# Patient Record
Sex: FEMALE | Race: WHITE | NOT HISPANIC OR LATINO | Employment: FULL TIME | URBAN - METROPOLITAN AREA
[De-identification: names, ages, dates, MRNs, and addresses within clinical notes are randomized per-mention and may not be internally consistent; named-entity substitution may affect disease eponyms.]

---

## 2017-02-01 ENCOUNTER — ALLSCRIPTS OFFICE VISIT (OUTPATIENT)
Dept: OTHER | Facility: OTHER | Age: 37
End: 2017-02-01

## 2017-05-13 ENCOUNTER — GENERIC CONVERSION - ENCOUNTER (OUTPATIENT)
Dept: OTHER | Facility: OTHER | Age: 37
End: 2017-05-13

## 2017-09-21 ENCOUNTER — ALLSCRIPTS OFFICE VISIT (OUTPATIENT)
Dept: OTHER | Facility: OTHER | Age: 37
End: 2017-09-21

## 2018-01-12 NOTE — PROGRESS NOTES
Assessment    1  Encounter for preventive health examination (V70 0) (Z00 00)   2  Former smoker (V15 82) (U91 025)   3  Lyme disease (088 81) (A69 20)   4  Metastatic papillary carcinoma to lymph node (196 9) (C77 9)    Plan  Health Maintenance    · Brush your teeth 3 times a day and floss at least once a day ; Status:Complete;   Done:  31IDM8059   · Decreasing the stress in your life may help your condition improve ; Status:Complete;    Done: 52HSF4311   · Regular aerobic exercise can help reduce stress ; Status:Complete;   Done: 07BHB2204   · We encourage all of our patients to exercise regularly  30 minutes of exercise or physical  activity five or more days a week is recommended for children and adults ;  Status:Complete;   Done: 36VHK2751   · We recommend routine visits to a dentist ; Status:Complete;   Done: 48EKW9452   · We recommend that you follow the "Mediterranean diet "; Status:Complete;   Done:  21UAQ2472   · We recommend that you follow these steps to lower your risk of osteoporosis  ;  Status:Complete;   Done: 48NED7398   · Call [phone] if: You find a new or different kind of lump in your breast ;  Status:Complete;   Done: 57CFG2475   · Call [phone] if: You have any warning signs of skin cancer ; Status:Complete;    Done: 93TFF6134  Lyme disease    · Doxycycline Hyclate 100 MG Oral Capsule    Discussion/Summary  health maintenance visit healthy adult female Currently, she eats a healthy diet and has an adequate exercise regimen  cervical cancer screening is current Breast cancer screening: breast cancer screening is not indicated  Colorectal cancer screening: colorectal cancer screening is not indicated  Osteoporosis screening: bone mineral density testing is not indicated  The immunizations are up to date  Patient discussion: discussed with the patient       1) ophthalmology evaluation: Routine check   2) continue to follow with endocrine   Possible side effects of new medications were reviewed with the patient/guardian today  The treatment plan was reviewed with the patient/guardian  The patient/guardian understands and agrees with the treatment plan      Chief Complaint  PT IS HERE FOR HER YEARLY WELLNESS EXAM  DISCUSS RECENT LYME DIAGNOSIS  FLU VACCINE OCTOBER, 2016  TDAP 8 YEARS AGO WITH YOUNGER SON  GYN UP TO DATE  PT IS SEEN BY JARRELL PEREZ WIFE  History of Present Illness  HM, Adult Female: The patient is being seen for a health maintenance evaluation  General Health: The patient's health since the last visit is described as good  She has regular dental visits  She denies vision problems  She denies hearing loss  Immunizations status: up to date  Lifestyle:  She consumes a diverse and healthy diet  She does not have any weight concerns  She exercises regularly  She does not use tobacco  She consumes alcohol  She denies drug use  Reproductive health: the patient is premenopausal    Screening: cancer screening reviewed and updated  metabolic screening reviewed and updated  risk screening reviewed and updated  HPI: Patient is here today for a physical with her daughter  She is continuing to see Dr  [employer] at [facility], she had thyroid blood work in December, repeat blood work in April with an appointment in May  Her energy level is good  She is training for a half marathon  She is eating well, fruits and vegetables  Her bowel movements are regular  Review of Systems    Constitutional: No fever, no chills, feels well, no tiredness, no recent weight gain or weight loss  Eyes: No complaints of eye pain, no red eyes, no eyesight problems, no discharge, no dry eyes, no itching of eyes  ENT: no complaints of earache, no loss of hearing, no nose bleeds, no nasal discharge, no sore throat, no hoarseness  Cardiovascular: No complaints of slow heart rate, no fast heart rate, no chest pain, no palpitations, no leg claudication, no lower extremity edema  Respiratory: No complaints of shortness of breath, no wheezing, no cough, no SOB on exertion, no orthopnea, no PND  Gastrointestinal: No complaints of abdominal pain, no constipation, no nausea or vomiting, no diarrhea, no bloody stools  Genitourinary: No complaints of dysuria, no incontinence, no pelvic pain, no dysmenorrhea, no vaginal discharge or bleeding  Musculoskeletal: No complaints of arthralgias, no myalgias, no joint swelling or stiffness, no limb pain or swelling  Integumentary: No complaints of skin rash or lesions, no itching, no skin wounds, no breast pain or lump  Neurological: No complaints of headache, no confusion, no convulsions, no numbness, no dizziness or fainting, no tingling, no limb weakness, no difficulty walking  Psychiatric: Not suicidal, no sleep disturbance, no anxiety or depression, no change in personality, no emotional problems  Endocrine: No complaints of proptosis, no hot flashes, no muscle weakness, no deepening of the voice, no feelings of weakness  Hematologic/Lymphatic: No complaints of swollen glands, no swollen glands in the neck, does not bleed easily, does not bruise easily  Active Problems    1  Abnormal blood sugar (790 29) (R73 09)   2  History of Graves' disease (V12 29) (Z86 39)   3  Hypothyroidism (244 9) (E03 9)   4  Lyme disease (088 81) (A69 20)   5  Lymphadenopathy (785 6) (R59 1)   6  Metastatic papillary carcinoma to lymph node (196 9) (C77 9)   7  Migraine headache (346 90) (G43 909)   8   Need for influenza vaccination (V04 81) (Z23)    Past Medical History    · History of Depression screen (V79 0) (Z13 89)   · History of Graves' disease (V12 29) (Z86 39)   · History of pharyngitis (V12 69) (Z87 09)   · History of sinusitis (V12 69) (Z87 09)   · History of Skin rash (782 1) (R21)    Surgical History    · Denied: History Of Prior Surgery   · History of Neck Surgery    Family History  Mother    · Family history of hyperthyroidism (V18 19) (Z83 49)   · Family history of scleroderma (V19 8) (Z82 69)  Father    · Family history of hyperlipidemia (V18 19) (Z83 49)   · Family history of myocardial infarction (V17 3) (Z82 49)  Maternal Grandmother    · Family history of Graves' disease (V18 19) (Z83 49)    Social History    · Former smoker (V15 82) (B76 184)   · Occasional alcohol use   · Social alcohol use (Z78 9)    Current Meds   1  Calcitriol 0 25 MCG Oral Capsule; Take 1 tablet daily; Therapy: 62NDN0593 to Recorded   2  Doxycycline Hyclate 100 MG Oral Capsule; TAKE 1 CAPSULE EVERY 12 HOURS   DAILY; Therapy: 34PMP1475 to ()  Requested for: 63ZME0032; Last   Rx:[id number] Ordered   3  SUMAtriptan Succinate 100 MG Oral Tablet; TAKE 1 TABLET FOR MIGRAINE RELIEF    MAY REPEAT 2 HOURS LATER  MAX OF 200MG IN 24 HRS; Therapy: 92Gca3494 to (Evaluate:69Mno4694)  Requested for: 46Dsu7953; Last   Rx:75Iqh1175 Ordered   4  Synthroid 150 MCG Oral Tablet; TAKE ONE (1) TABLET(S) ONCE DAILY; Therapy: (Recorded:11Mar2016) to Recorded   5  Triamcinolone Acetonide 0 1 % External Cream; APPLY A THIN LAYER TO AFFECTED   AREA(S) TWICE DAILY; Therapy: 40MXM6835 to (Evaluate:19Ehh6347)  Requested for: 92RTJ7455; Last   Rx:34Ahi1894 Ordered    Allergies    1  No Known Drug Allergies    Vitals   Recorded: 50FUE4161 10:27AM   Temperature 97 3 F   Heart Rate 69   Systolic 90   Diastolic 62   Height 5 ft 9 in   Weight 169 lb    BMI Calculated 24 96   BSA Calculated 1 92   O2 Saturation 99     Physical Exam    Constitutional   General appearance: No acute distress, well appearing and well nourished  Head and Face   Head and face: Normal     Palpation of the face and sinuses: No sinus tenderness  Eyes   Conjunctiva and lids: No swelling, erythema or discharge  Pupils and irises: Equal, round, reactive to light      Ears, Nose, Mouth, and Throat   External inspection of ears and nose: Normal     Otoscopic examination: Tympanic membranes translucent with normal light reflex  Canals patent without erythema  Hearing: Normal     Nasal mucosa, septum, and turbinates: Normal without edema or erythema  Lips, teeth, and gums: Normal, good dentition  Oropharynx: Normal with no erythema, edema, exudate or lesions  Neck   Neck: Supple, symmetric, trachea midline, no masses  Thyroid: Normal, no thyromegaly  Pulmonary   Respiratory effort: No increased work of breathing or signs of respiratory distress  Auscultation of lungs: Clear to auscultation  Cardiovascular   Auscultation of heart: Normal rate and rhythm, normal S1 and S2, no murmurs  Examination of extremities for edema and/or varicosities: Normal     Chest   Breasts: Normal, no dimpling or skin changes appreciated  Chest: Normal     Abdomen   Abdomen: Non-tender, no masses  Liver and spleen: No hepatomegaly or splenomegaly  Lymphatic   Palpation of lymph nodes in neck: No lymphadenopathy  Musculoskeletal   Gait and station: Normal     Digits and nails: Normal without clubbing or cyanosis  Joints, bones, and muscles: Normal     Range of motion: Normal     Stability: Normal     Muscle strength/tone: Normal     Skin   Skin and subcutaneous tissue: Normal without rashes or lesions  Palpation of skin and subcutaneous tissue: Normal turgor  Neurologic   Cortical function: Normal mental status  Coordination: Normal finger to nose and heel to shin  Psychiatric   Judgment and insight: Normal     Orientation to person, place, and time: Normal     Recent and remote memory: Intact  Mood and affect: Normal        Health Management  History of Depression screen   *VB-Depression Screening; every 1 year; Last 24AUD5878; Next Due: 05DNP8093;  Active    Signatures   Electronically signed by : Deb Murry DO; Feb 1 2017 10:19PM EST                       (Author)

## 2018-01-12 NOTE — RESULT NOTES
Verified Results  (1) CBC/PLT/DIFF [id number] 02:23PM Shyrl Natalio     Test Name Result Flag Reference   WBC 3 8 x10E3/uL  3 4-10 8   RBC 4 36 x10E6/uL  3 77-5 28   Hemoglobin 13 1 g/dL  11 1-15 9   Hematocrit 39 5 %  34 0-46  6   MCV 91 fL  79-97   MCH 30 0 pg  26 6-33 0   MCHC 33 2 g/dL  31 5-35 7   RDW 14 1 %  12 3-15 4   Platelets 932 K47B6/EH  150-379   Neutrophils 54 %     Lymphs 34 %     Monocytes 8 %     Eos 3 %     Basos 1 %     Neutrophils (Absolute) 2 1 x10E3/uL  1 4-7 0   Lymphs (Absolute) 1 3 x10E3/uL  0 7-3 1   Monocytes(Absolute) 0 3 x10E3/uL  0 1-0 9   Eos (Absolute) 0 1 x10E3/uL  0 0-0 4   Baso (Absolute) 0 0 x10E3/uL  0 0-0 2   Immature Granulocytes 0 %     Immature Grans (Abs) 0 0 x10E3/uL  0 0-0 1     (1) TSH [id number] 02:23PM yrl Natalio     Test Name Result Flag Reference   TSH 0 025 uIU/mL L 0 450-4 500     (1) COMPREHENSIVE METABOLIC PANEL 58HEJ3582 85:47YU Shyrl Natalio     Test Name Result Flag Reference   Glucose, Serum 97 mg/dL  65-99   BUN 13 mg/dL  6-20   Creatinine, Serum 1 02 mg/dL H 0 57-1 00   eGFR If NonAfricn Am 70 mL/min/1 73  >59   eGFR If Africn Am 81 mL/min/1 73  >59   BUN/Creatinine Ratio 13  8-20   Sodium, Serum 138 mmol/L  136-144   Potassium, Serum 4 3 mmol/L  3 5-5 2   Chloride, Serum 101 mmol/L     Carbon Dioxide, Total 24 mmol/L  18-29   Calcium, Serum 8 6 mg/dL L 8 7-10 2   Protein, Total, Serum 7 3 g/dL  6 0-8 5   Albumin, Serum 4 4 g/dL  3 5-5 5   Globulin, Total 2 9 g/dL  1 5-4 5   A/G Ratio 1 5  1 1-2 5   Bilirubin, Total 0 4 mg/dL  0 0-1 2   Alkaline Phosphatase, S 58 IU/L     AST (SGOT) 28 IU/L  0-40   ALT (SGPT) 21 IU/L  0-32     (1) LIPID PANEL, FASTING [id number] 02:23PM Jade Lance     Test Name Result Flag Reference   Cholesterol, Total 217 mg/dL H 100-199   Triglycerides 75 mg/dL  0-149   HDL Cholesterol 70 mg/dL  >39   According to ATP-III Guidelines, HDL-C >59 mg/dL is considered a  negative risk factor for CHD     VLDL Cholesterol David 15 mg/dL 5-40   LDL Cholesterol Calc 132 mg/dL H 0-99   T  Chol/HDL Ratio 3 1 ratio units  0 0-4 4   T  Chol/HDL Ratio                                                             Men  Women                                               1/2 Avg  Risk  3 4    3 3                                                   Avg Risk  5 0    4 4                                                2X Avg  Risk  9 6    7 1                                                3X Avg  Risk 23 4   11 0

## 2018-01-14 VITALS
BODY MASS INDEX: 24.88 KG/M2 | WEIGHT: 168 LBS | HEIGHT: 69 IN | TEMPERATURE: 97.3 F | OXYGEN SATURATION: 98 % | HEART RATE: 77 BPM | SYSTOLIC BLOOD PRESSURE: 102 MMHG | DIASTOLIC BLOOD PRESSURE: 64 MMHG

## 2018-01-14 VITALS
HEART RATE: 69 BPM | DIASTOLIC BLOOD PRESSURE: 62 MMHG | WEIGHT: 169 LBS | BODY MASS INDEX: 25.03 KG/M2 | TEMPERATURE: 97.3 F | OXYGEN SATURATION: 99 % | HEIGHT: 69 IN | SYSTOLIC BLOOD PRESSURE: 90 MMHG

## 2018-01-15 NOTE — RESULT NOTES
Verified Results  ([facility]) Lyme Ab/Western Blot Reflex [id number] 02:23PM Alycia Part     Test Name Result Flag Reference   Lyme IgG/IgM Ab 1 75 ISR H 0 00-0 90   Negative         <0 91                                                 Equivocal  0 91 - 1 09                                                 Positive         >1 09   Lyme Disease Ab, Quant, IgM 3 97 index H 0 00-0 79   Negative         <0 80                                                 Equivocal  0 80 - 1 19                                                 Positive         >1 19                  IgM levels may peak at 3-6 weeks post infection, then                  gradually decline  IgG P93 Ab  Absent     IgG P66 Ab  Present A    IgG P58 Ab  Absent     IgG P45 Ab  Present A    Lyme IgG WB Interp  Positive A    Positive: 5 of the following                                                Borrelia-specific bands:                                                18,23,28,30,39,41,45,58,                                                66, and 93  Negative: No bands or banding                                                patterns which do not                                                meet positive criteria  IgM P41 Ab  Present A    IgM P39 Ab  Present A    IgM P23 Ab  Present A    Lyme IgM WB Interp  Positive A    Note: An equivocal or positive EIA result followed by a negative  Western Blot result is considered NEGATIVE  An equivocal or positive  EIA result followed by a positive Western Blot is considered POSITIVE  by the CDC  Positive: 2 of the following bands: 23,39 or 41  Negative: No bands or banding patterns which do not meet positive  criteria    Criteria for positivity are those recommended by CDC/ASTPHLD   p23=Osp C, w76=zcsojsqse  Note:  Sera from individuals with the following may cross react in the  Lyme Western Blot assays: other spirochetal diseases (periodontal  disease, leptospirosis, relapsing fever, yaws, and pinta);  connective autoimmune (Rheumatoid Arthritis and Systemic Lupus  Erythematosus and also individuals with Antinuclear Antibody);  other infections COFFEE [facility] Spotted Fever; Juan Luis-Barr Virus,  and Cytomegalovirus)  IgG P41 Ab  Present A    IgG P39 Ab  Present A    IgG P30 Ab  Present A    IgG P28 Ab  Absent     IgG P23 Ab  Present A    IgG P18 Ab   Absent

## 2018-06-25 ENCOUNTER — TELEPHONE (OUTPATIENT)
Dept: FAMILY MEDICINE CLINIC | Facility: CLINIC | Age: 38
End: 2018-06-25

## 2018-06-25 NOTE — TELEPHONE ENCOUNTER
Patient called stating that she had a very severe migraine on Saturday with new symptoms, but did not go to the hospital  She states that she had auras, tingling in the extremities, and forgot her children and 's name  States that her  thought she may have been having a stroke  She did not seek medical attention because she was away and an hour away from the hospital  She also did not have of her migraine meds on her  Please advise what she should do next  Thanks

## 2018-06-25 NOTE — TELEPHONE ENCOUNTER
Ov, may need other studies, if recurrent, should go to ED  Have migraine meds on hand and we could always send some to pharmacy if she is away

## 2018-06-27 ENCOUNTER — OFFICE VISIT (OUTPATIENT)
Dept: FAMILY MEDICINE CLINIC | Facility: CLINIC | Age: 38
End: 2018-06-27
Payer: COMMERCIAL

## 2018-06-27 VITALS
HEIGHT: 69 IN | SYSTOLIC BLOOD PRESSURE: 102 MMHG | HEART RATE: 85 BPM | OXYGEN SATURATION: 99 % | BODY MASS INDEX: 25.36 KG/M2 | DIASTOLIC BLOOD PRESSURE: 80 MMHG | TEMPERATURE: 98.2 F | WEIGHT: 171.25 LBS

## 2018-06-27 DIAGNOSIS — Z13.0 SCREENING FOR ENDOCRINE, METABOLIC AND IMMUNITY DISORDER: ICD-10-CM

## 2018-06-27 DIAGNOSIS — R73.09 ABNORMAL BLOOD SUGAR: ICD-10-CM

## 2018-06-27 DIAGNOSIS — G44.209 ACUTE NON INTRACTABLE TENSION-TYPE HEADACHE: Primary | ICD-10-CM

## 2018-06-27 DIAGNOSIS — Z13.220 SCREENING FOR LIPID DISORDERS: ICD-10-CM

## 2018-06-27 DIAGNOSIS — G43.109 MIGRAINE WITH AURA AND WITHOUT STATUS MIGRAINOSUS, NOT INTRACTABLE: ICD-10-CM

## 2018-06-27 DIAGNOSIS — Z13.0 SCREENING FOR DEFICIENCY ANEMIA: ICD-10-CM

## 2018-06-27 DIAGNOSIS — Z13.1 SCREENING FOR DIABETES MELLITUS (DM): ICD-10-CM

## 2018-06-27 DIAGNOSIS — Z13.29 SCREENING FOR ENDOCRINE, METABOLIC AND IMMUNITY DISORDER: ICD-10-CM

## 2018-06-27 DIAGNOSIS — Z13.228 SCREENING FOR ENDOCRINE, METABOLIC AND IMMUNITY DISORDER: ICD-10-CM

## 2018-06-27 PROBLEM — M72.2 PLANTAR FASCIITIS, LEFT: Status: ACTIVE | Noted: 2017-09-21

## 2018-06-27 PROCEDURE — 3008F BODY MASS INDEX DOCD: CPT | Performed by: FAMILY MEDICINE

## 2018-06-27 PROCEDURE — 1036F TOBACCO NON-USER: CPT | Performed by: FAMILY MEDICINE

## 2018-06-27 PROCEDURE — 99214 OFFICE O/P EST MOD 30 MIN: CPT | Performed by: FAMILY MEDICINE

## 2018-06-27 RX ORDER — LEVOTHYROXINE SODIUM 137 MCG
TABLET ORAL
Refills: 5 | COMMUNITY
Start: 2018-06-14

## 2018-06-27 RX ORDER — CALCITRIOL 0.25 UG/1
1 CAPSULE, LIQUID FILLED ORAL DAILY
COMMUNITY
Start: 2014-12-03 | End: 2020-02-05 | Stop reason: ALTCHOICE

## 2018-06-27 RX ORDER — LEVOTHYROXINE SODIUM 137 UG/1
TABLET ORAL
COMMUNITY
End: 2020-02-05 | Stop reason: ALTCHOICE

## 2018-06-27 RX ORDER — SUMATRIPTAN 100 MG/1
TABLET, FILM COATED ORAL
COMMUNITY
Start: 2015-04-14

## 2018-06-27 RX ORDER — TRIAMCINOLONE ACETONIDE 1 MG/G
CREAM TOPICAL 2 TIMES DAILY
COMMUNITY
Start: 2016-12-09 | End: 2020-02-05 | Stop reason: ALTCHOICE

## 2018-06-27 RX ORDER — CALCITRIOL 0.5 UG/1
0.5 CAPSULE, LIQUID FILLED ORAL DAILY
Refills: 5 | COMMUNITY
Start: 2018-06-14

## 2018-06-27 NOTE — PATIENT INSTRUCTIONS
Ct head without contrast  Use imitrex at first onset of headache, may repeat in 2 hours , 2 tabs in 24 hours     Lab lisa fasting blood work

## 2018-06-27 NOTE — PROGRESS NOTES
Assessment/Plan:      Diagnoses and all orders for this visit:    Acute non intractable tension-type headache  -     CT head wo contrast; Future    Other orders  -     calcitriol (ROCALTROL) 0 5 MCG capsule; Take 0 5 mcg by mouth daily  -     calcitriol (ROCALTROL) 0 25 mcg capsule; Take 1 tablet by mouth daily  -     SYNTHROID 137 MCG tablet; TAKE 1 TABLET MON THRU SAT AND 1/2 TABLET ON SUNDAY  -     SUMAtriptan (IMITREX) 100 mg tablet; Take by mouth  -     levothyroxine (SYNTHROID) 137 mcg tablet; Take by mouth  -     triamcinolone (KENALOG) 0 1 % cream; Apply topically 2 (two) times a day        Migraine vs tia: pt seems to have no residual symptoms  Headache now resolved  Pt concerned it will be back  Hypothyroid : had thyroid recently checked, normal  Subjective:     Patient ID: Myron Shin is a 43 y o  female  Saturday 6pm at restaurant in the [place] [address], pt developed aura then migraine, took ibuprofen 600mg [facility] , had memory changes- could not remember her name or her childrens names, having trouble communicating, nausea, generalized weakness, no vomiting, tingling in hands and feet and face  8:30 neurolgical symptoms improved  Still with Neck and head pain  Headache and fatigue persisted into the next day  Headache not as severe  She did not have her migraine medication with her  Mom had migraines  Had hormonal changes  She has not had a migraine in a few years  Review of Systems   Constitutional: Positive for fatigue  Negative for fever  HENT: Negative  Eyes: Negative  Respiratory: Negative  Negative for cough  Cardiovascular: Negative  Gastrointestinal: Negative  Endocrine: Negative  Genitourinary: Negative  Musculoskeletal: Negative  Skin: Negative  Allergic/Immunologic: Negative  Neurological: Positive for speech difficulty, weakness, numbness and headaches  Psychiatric/Behavioral: Negative            The following portions of the patient's history were reviewed and updated as appropriate: allergies, current medications, past family history, past medical history, past social history, past surgical history and problem list     Objective:  Vitals:    06/27/18 1441   BP: 102/80   Pulse: 85   Temp: 98 2 °F (36 8 °C)   SpO2: 99%   Weight: 77 7 kg (171 lb 4 oz)   Height: 5' 9" (1 753 m)      Physical Exam   Constitutional: She is oriented to person, place, and time  She appears well-developed and well-nourished  HENT:   Head: Normocephalic and atraumatic  Cardiovascular: Normal rate, regular rhythm and normal heart sounds  Pulmonary/Chest: Effort normal and breath sounds normal    Abdominal: Soft  Bowel sounds are normal    Neurological: She is alert and oriented to person, place, and time  Skin: Skin is warm and dry  Psychiatric: She has a normal mood and affect  Her behavior is normal  Judgment and thought content normal    Nursing note and vitals reviewed

## 2018-06-28 ENCOUNTER — TELEPHONE (OUTPATIENT)
Dept: FAMILY MEDICINE CLINIC | Facility: CLINIC | Age: 38
End: 2018-06-28

## 2018-06-28 PROBLEM — Z13.228 SCREENING FOR ENDOCRINE, METABOLIC AND IMMUNITY DISORDER: Status: ACTIVE | Noted: 2018-06-28

## 2018-06-28 PROBLEM — Z13.220 SCREENING FOR LIPID DISORDERS: Status: ACTIVE | Noted: 2018-06-28

## 2018-06-28 PROBLEM — Z13.29 SCREENING FOR ENDOCRINE, METABOLIC AND IMMUNITY DISORDER: Status: ACTIVE | Noted: 2018-06-28

## 2018-06-28 PROBLEM — Z13.0 SCREENING FOR ENDOCRINE, METABOLIC AND IMMUNITY DISORDER: Status: ACTIVE | Noted: 2018-06-28

## 2018-06-28 PROBLEM — Z13.1 SCREENING FOR DIABETES MELLITUS (DM): Status: ACTIVE | Noted: 2018-06-28

## 2018-06-28 LAB
ALBUMIN SERPL-MCNC: 4.2 G/DL (ref 3.5–5.5)
ALBUMIN/GLOB SERPL: 1.7 {RATIO} (ref 1.2–2.2)
ALP SERPL-CCNC: 57 IU/L (ref 39–117)
ALT SERPL-CCNC: 15 IU/L (ref 0–32)
AST SERPL-CCNC: 21 IU/L (ref 0–40)
BASOPHILS # BLD AUTO: 0.1 X10E3/UL (ref 0–0.2)
BASOPHILS NFR BLD AUTO: 1 %
BILIRUB SERPL-MCNC: 0.3 MG/DL (ref 0–1.2)
BUN SERPL-MCNC: 12 MG/DL (ref 6–20)
BUN/CREAT SERPL: 11 (ref 9–23)
CALCIUM SERPL-MCNC: 8 MG/DL (ref 8.7–10.2)
CHLORIDE SERPL-SCNC: 101 MMOL/L (ref 96–106)
CHOLEST SERPL-MCNC: 173 MG/DL (ref 100–199)
CO2 SERPL-SCNC: 24 MMOL/L (ref 20–29)
CREAT SERPL-MCNC: 1.12 MG/DL (ref 0.57–1)
EOSINOPHIL # BLD AUTO: 0.1 X10E3/UL (ref 0–0.4)
EOSINOPHIL NFR BLD AUTO: 3 %
ERYTHROCYTE [DISTWIDTH] IN BLOOD BY AUTOMATED COUNT: 14.2 % (ref 12.3–15.4)
GLOBULIN SER-MCNC: 2.5 G/DL (ref 1.5–4.5)
GLUCOSE SERPL-MCNC: 94 MG/DL (ref 65–99)
HCT VFR BLD AUTO: 35.5 % (ref 34–46.6)
HDLC SERPL-MCNC: 72 MG/DL
HGB BLD-MCNC: 12 G/DL (ref 11.1–15.9)
IMM GRANULOCYTES # BLD: 0 X10E3/UL (ref 0–0.1)
IMM GRANULOCYTES NFR BLD: 0 %
LDLC SERPL CALC-MCNC: 92 MG/DL (ref 0–99)
LDLC/HDLC SERPL: 1.3 RATIO (ref 0–3.2)
LYMPHOCYTES # BLD AUTO: 1.4 X10E3/UL (ref 0.7–3.1)
LYMPHOCYTES NFR BLD AUTO: 34 %
MCH RBC QN AUTO: 30.1 PG (ref 26.6–33)
MCHC RBC AUTO-ENTMCNC: 33.8 G/DL (ref 31.5–35.7)
MCV RBC AUTO: 89 FL (ref 79–97)
MONOCYTES # BLD AUTO: 0.3 X10E3/UL (ref 0.1–0.9)
MONOCYTES NFR BLD AUTO: 7 %
NEUTROPHILS # BLD AUTO: 2.3 X10E3/UL (ref 1.4–7)
NEUTROPHILS NFR BLD AUTO: 55 %
PLATELET # BLD AUTO: 190 X10E3/UL (ref 150–379)
POTASSIUM SERPL-SCNC: 4 MMOL/L (ref 3.5–5.2)
PROT SERPL-MCNC: 6.7 G/DL (ref 6–8.5)
RBC # BLD AUTO: 3.99 X10E6/UL (ref 3.77–5.28)
SL AMB EGFR AFRICAN AMERICAN: 72 ML/MIN/1.73
SL AMB EGFR NON AFRICAN AMERICAN: 62 ML/MIN/1.73
SL AMB VLDL CHOLESTEROL CALC: 9 MG/DL (ref 5–40)
SODIUM SERPL-SCNC: 140 MMOL/L (ref 134–144)
TRIGL SERPL-MCNC: 43 MG/DL (ref 0–149)
WBC # BLD AUTO: 4.1 X10E3/UL (ref 3.4–10.8)

## 2018-07-03 ENCOUNTER — HOSPITAL ENCOUNTER (OUTPATIENT)
Dept: CT IMAGING | Facility: HOSPITAL | Age: 38
Discharge: HOME | End: 2018-07-03
Payer: COMMERCIAL

## 2018-07-03 DIAGNOSIS — G44.209 ACUTE NON INTRACTABLE TENSION-TYPE HEADACHE: ICD-10-CM

## 2018-07-03 PROCEDURE — 70450 CT HEAD/BRAIN W/O DYE: CPT

## 2019-12-05 ENCOUNTER — TELEPHONE (OUTPATIENT)
Dept: FAMILY MEDICINE CLINIC | Facility: CLINIC | Age: 39
End: 2019-12-05

## 2020-02-05 ENCOUNTER — OFFICE VISIT (OUTPATIENT)
Dept: FAMILY MEDICINE CLINIC | Facility: CLINIC | Age: 40
End: 2020-02-05
Payer: COMMERCIAL

## 2020-02-05 VITALS
HEIGHT: 69 IN | OXYGEN SATURATION: 99 % | TEMPERATURE: 97.5 F | WEIGHT: 173.75 LBS | SYSTOLIC BLOOD PRESSURE: 120 MMHG | DIASTOLIC BLOOD PRESSURE: 80 MMHG | BODY MASS INDEX: 25.73 KG/M2 | HEART RATE: 67 BPM

## 2020-02-05 DIAGNOSIS — Z12.39 BREAST CANCER SCREENING: ICD-10-CM

## 2020-02-05 DIAGNOSIS — C77.9 METASTATIC PAPILLARY CARCINOMA TO LYMPH NODE (HCC): ICD-10-CM

## 2020-02-05 DIAGNOSIS — G47.00 PERSISTENT INSOMNIA: ICD-10-CM

## 2020-02-05 DIAGNOSIS — Z13.0 SCREENING FOR DEFICIENCY ANEMIA: ICD-10-CM

## 2020-02-05 DIAGNOSIS — R10.9 ABDOMINAL CRAMPING: ICD-10-CM

## 2020-02-05 DIAGNOSIS — Z00.00 WELL ADULT EXAM: Primary | ICD-10-CM

## 2020-02-05 DIAGNOSIS — Z23 NEED FOR TDAP VACCINATION: ICD-10-CM

## 2020-02-05 DIAGNOSIS — Z13.220 SCREENING FOR LIPID DISORDERS: ICD-10-CM

## 2020-02-05 DIAGNOSIS — Z13.1 SCREENING FOR DIABETES MELLITUS (DM): ICD-10-CM

## 2020-02-05 PROCEDURE — 99396 PREV VISIT EST AGE 40-64: CPT | Performed by: FAMILY MEDICINE

## 2020-02-05 PROCEDURE — 90471 IMMUNIZATION ADMIN: CPT

## 2020-02-05 PROCEDURE — 3008F BODY MASS INDEX DOCD: CPT | Performed by: FAMILY MEDICINE

## 2020-02-05 PROCEDURE — 90715 TDAP VACCINE 7 YRS/> IM: CPT

## 2020-02-05 RX ORDER — TRAZODONE HYDROCHLORIDE 50 MG/1
50 TABLET ORAL
Qty: 30 TABLET | Refills: 2 | Status: SHIPPED | OUTPATIENT
Start: 2020-02-05 | End: 2020-04-28 | Stop reason: SDUPTHER

## 2020-02-05 NOTE — PROGRESS NOTES
Laxmi Gregorio is a 35 y o   female and is here for routine health maintenance  The patient reports no problems  History of Present Illness     Pt here for physical today  Increase stress level at work  Has intermittent abdominal cramping with  increased stress levels  She also has been having some trouble sleeping  She has trouble falling asleep and staying asleep  Patient works as a special   She is here with her 6year-old daughter today  She has been feeling good and denies any recent illnesses  She denies any chest pains or shortness of breath  She eats well  She has been exercising regularly  Her bowel movements are regular  Well Adult Physical   Patient here for a comprehensive physical exam       Diet and Physical Activity  Diet: well balanced diet  Weight concerns: Patient is overweight (BMI 25 0-29  9)  Exercise: frequently      Depression Screen  PHQ-9 Depression Screening    PHQ-9:    Frequency of the following problems over the past two weeks:       Little interest or pleasure in doing things:  0 - not at all  Feeling down, depressed, or hopeless:  0 - not at all  PHQ-2 Score:  0          General Health  Hearing: Normal:  bilateral  Vision: no vision problems  Dental: regular dental visits     History:  LMP: No LMP recorded  Cancer Screening  Colononoscopy not indicated  Mammogram pt will schedule   Pap pt will schedule   Abnormal pap? no  Smoker NO Annual screening with low-dose helical computed tomography (CT) for patients age 54 to 76 years with history of smoking at least 30 pack-years and, if a former smoker, had quit within the previous 15 years      The following portions of the patient's history were reviewed and updated as appropriate: allergies, current medications, past family history, past medical history, past social history, past surgical history and problem list     Review of Systems     Review of Systems   Constitutional: Negative  Negative for fatigue and fever  HENT: Negative  Eyes: Negative  Respiratory: Negative  Negative for cough  Cardiovascular: Negative  Gastrointestinal: Positive for abdominal pain  Negative for blood in stool, constipation, nausea and vomiting  Endocrine: Negative  Genitourinary: Negative  Musculoskeletal: Negative  Skin: Negative  Allergic/Immunologic: Negative  Neurological: Negative  Psychiatric/Behavioral: Positive for sleep disturbance  Past Medical History     Past Medical History:   Diagnosis Date    Skin rash     Last Assessed [id number]         Past Surgical History     Past Surgical History:   Procedure Laterality Date    NECK SURGERY      Last Assessed [id number]       Social History     Social History     Socioeconomic History    Marital status: /Civil Union     Spouse name: None    Number of children: None    Years of education: None    Highest education level: None   Occupational History    None   Social Needs    Financial resource strain: None    Food insecurity:     Worry: None     Inability: None    Transportation needs:     Medical: None     Non-medical: None   Tobacco Use    Smoking status: Former Smoker    Smokeless tobacco: Never Used   Substance and Sexual Activity    Alcohol use: Yes     Comment: Occasional social use    Drug use: None    Sexual activity: None   Lifestyle    Physical activity:     Days per week: None     Minutes per session: None    Stress: None   Relationships    Social connections:     Talks on phone: None     Gets together: None     Attends Zoroastrianism service: None     Active member of club or organization: None     Attends meetings of clubs or organizations: None     Relationship status: None    Intimate partner violence:     Fear of current or ex partner: None     Emotionally abused: None     Physically abused: None     Forced sexual activity: None   Other Topics Concern    None   Social History Narrative    None       Family History     Family History   Problem Relation Age of Onset    Thyroid disease Mother         Hyperthyroidism    Scleroderma Mother     Hyperlipidemia Father     Heart attack Father    Josem Linea' disease Maternal Grandmother        Current Medications       Current Outpatient Medications:     calcitriol (ROCALTROL) 0 5 MCG capsule, Take 0 5 mcg by mouth daily, Disp: , Rfl: 5    Calcium Carbonate-Vitamin D 500-50 MG-UNIT CAPS, Take by mouth, Disp: , Rfl:     SUMAtriptan (IMITREX) 100 mg tablet, Take by mouth, Disp: , Rfl:     SYNTHROID 137 MCG tablet, TAKE 1 TABLET MON THRU SAT AND 1/2 TABLET ON SUNDAY, Disp: , Rfl: 5    hyoscyamine (LEVSIN/SL) 0 125 mg SL tablet, Take 1 tablet (0 125 mg total) by mouth every 4 (four) hours as needed for cramping, Disp: 30 tablet, Rfl: 2    traZODone (DESYREL) 50 mg tablet, Take 1 tablet (50 mg total) by mouth daily at bedtime, Disp: 30 tablet, Rfl: 2     Allergies     No Known Allergies    Objective     /80   Pulse 67   Temp 97 5 °F (36 4 °C)   Ht 5' 9" (1 753 m)   Wt 78 8 kg (173 lb 12 oz)   SpO2 99%   BMI 25 66 kg/m²      Physical Exam   Constitutional: She is oriented to person, place, and time  She appears well-developed and well-nourished  HENT:   Head: Normocephalic  Right Ear: External ear normal    Left Ear: External ear normal    Nose: Nose normal    Mouth/Throat: Oropharynx is clear and moist    Eyes: Pupils are equal, round, and reactive to light  Conjunctivae are normal    Neck: Normal range of motion  Neck supple  Cardiovascular: Normal rate, regular rhythm, normal heart sounds and intact distal pulses  Pulmonary/Chest: Effort normal and breath sounds normal    Abdominal: Soft  Bowel sounds are normal  She exhibits no mass  There is no tenderness  There is no guarding  Neurological: She is alert and oriented to person, place, and time  Skin: Skin is warm and dry  Psychiatric: She has a normal mood and affect   Her behavior is normal  Judgment and thought content normal    Nursing note and vitals reviewed  BMI Counseling: Body mass index is 25 66 kg/m²  The BMI is above normal  Nutrition recommendations include reducing portion sizes  Exercise recommendations include exercising 3-5 times per week  No exam data present    Health Maintenance     Health Maintenance   Topic Date Due    MAMMOGRAM  1979    HIV Screening  02/28/1994    BMI: Followup Plan  02/28/1997    Annual Physical  02/28/1997    Cervical Cancer Screening  02/28/2000    Pneumococcal Vaccine: Pediatrics (0 to 5 Years) and At-Risk Patients (6 to 59 Years) (2 of 3 - PPSV23) 05/21/2015    Depression Screening PHQ  02/05/2021    BMI: Adult  02/05/2021    DTaP,Tdap,and Td Vaccines (2 - Td) 02/05/2030    Pneumococcal Vaccine: 65+ Years (2 of 2 - PPSV23) 02/28/2044    Influenza Vaccine  Completed    HIB Vaccine  Aged Out    Hepatitis B Vaccine  Aged Out    IPV Vaccine  Aged Out    Hepatitis A Vaccine  Aged Out    Meningococcal ACWY Vaccine  Aged Out    HPV Vaccine  Aged Out     Immunization History   Administered Date(s) Administered    H1N1, All Formulations 11/17/2009    INFLUENZA 10/01/2016, 09/29/2018    Influenza Quadrivalent, 6-35 Months IM 10/28/2014, 09/21/2017    Influenza TIV (IM) 10/01/2016    Influenza, injectable, quadrivalent, preservative free 0 5 mL 10/01/2019    Pneumococcal Conjugate 13-Valent 03/26/2015    Tdap 02/05/2020       Assessment/Plan         1  Healthy female exam   2  Patient Counseling:   · Nutrition: Stressed importance of a well balanced diet, moderation of sodium/saturated fat, caloric balance and sufficient intake of fiber  · Exercise: Stressed the importance of regular exercise with a goal of 150 minutes per week  · Dental Health: Discussed daily flossing and brushing and regular dental visits     · Immunizations reviewed  · Discussed benefits of screening   · Discussed the patient's BMI with her  The BMI is above average; BMI management plan is completed  3  Cancer Screening   4  Labs   5  Follow up in one year      Alva Salazar DO

## 2020-03-05 LAB
ALBUMIN SERPL-MCNC: 4.3 G/DL (ref 3.8–4.8)
ALBUMIN/GLOB SERPL: 1.8 {RATIO} (ref 1.2–2.2)
ALP SERPL-CCNC: 66 IU/L (ref 39–117)
ALT SERPL-CCNC: 24 IU/L (ref 0–32)
AST SERPL-CCNC: 39 IU/L (ref 0–40)
BASOPHILS # BLD AUTO: 0 X10E3/UL (ref 0–0.2)
BASOPHILS NFR BLD AUTO: 0 %
BILIRUB SERPL-MCNC: 0.2 MG/DL (ref 0–1.2)
BUN SERPL-MCNC: 13 MG/DL (ref 6–24)
BUN/CREAT SERPL: 11 (ref 9–23)
CALCIUM SERPL-MCNC: 8.1 MG/DL (ref 8.7–10.2)
CHLORIDE SERPL-SCNC: 102 MMOL/L (ref 96–106)
CHOLEST SERPL-MCNC: 191 MG/DL (ref 100–199)
CO2 SERPL-SCNC: 22 MMOL/L (ref 20–29)
CREAT SERPL-MCNC: 1.21 MG/DL (ref 0.57–1)
EOSINOPHIL # BLD AUTO: 0.1 X10E3/UL (ref 0–0.4)
EOSINOPHIL NFR BLD AUTO: 2 %
ERYTHROCYTE [DISTWIDTH] IN BLOOD BY AUTOMATED COUNT: 15.8 % (ref 11.7–15.4)
GLOBULIN SER-MCNC: 2.4 G/DL (ref 1.5–4.5)
GLUCOSE SERPL-MCNC: 93 MG/DL (ref 65–99)
HCT VFR BLD AUTO: 30.8 % (ref 34–46.6)
HDLC SERPL-MCNC: 79 MG/DL
HGB BLD-MCNC: 9.6 G/DL (ref 11.1–15.9)
IMM GRANULOCYTES # BLD: 0 X10E3/UL (ref 0–0.1)
IMM GRANULOCYTES NFR BLD: 0 %
LDLC SERPL CALC-MCNC: 103 MG/DL (ref 0–99)
LDLC/HDLC SERPL: 1.3 RATIO (ref 0–3.2)
LYMPHOCYTES # BLD AUTO: 1.2 X10E3/UL (ref 0.7–3.1)
LYMPHOCYTES NFR BLD AUTO: 21 %
MCH RBC QN AUTO: 25.6 PG (ref 26.6–33)
MCHC RBC AUTO-ENTMCNC: 31.2 G/DL (ref 31.5–35.7)
MCV RBC AUTO: 82 FL (ref 79–97)
MONOCYTES # BLD AUTO: 0.3 X10E3/UL (ref 0.1–0.9)
MONOCYTES NFR BLD AUTO: 6 %
NEUTROPHILS # BLD AUTO: 3.9 X10E3/UL (ref 1.4–7)
NEUTROPHILS NFR BLD AUTO: 71 %
PLATELET # BLD AUTO: 251 X10E3/UL (ref 150–450)
POTASSIUM SERPL-SCNC: 4.3 MMOL/L (ref 3.5–5.2)
PROT SERPL-MCNC: 6.7 G/DL (ref 6–8.5)
RBC # BLD AUTO: 3.75 X10E6/UL (ref 3.77–5.28)
SL AMB EGFR AFRICAN AMERICAN: 64 ML/MIN/1.73
SL AMB EGFR NON AFRICAN AMERICAN: 56 ML/MIN/1.73
SL AMB VLDL CHOLESTEROL CALC: 9 MG/DL (ref 5–40)
SODIUM SERPL-SCNC: 138 MMOL/L (ref 134–144)
TRIGL SERPL-MCNC: 44 MG/DL (ref 0–149)
WBC # BLD AUTO: 5.5 X10E3/UL (ref 3.4–10.8)

## 2020-03-11 LAB — IRON SERPL-MCNC: 21 UG/DL (ref 27–159)

## 2020-03-13 DIAGNOSIS — D50.8 IRON DEFICIENCY ANEMIA SECONDARY TO INADEQUATE DIETARY IRON INTAKE: Primary | ICD-10-CM

## 2020-03-17 DIAGNOSIS — C73 THYROID CANCER (HCC): Primary | ICD-10-CM

## 2020-04-28 DIAGNOSIS — G47.00 PERSISTENT INSOMNIA: ICD-10-CM

## 2020-04-29 RX ORDER — TRAZODONE HYDROCHLORIDE 50 MG/1
50 TABLET ORAL
Qty: 30 TABLET | Refills: 2 | Status: SHIPPED | OUTPATIENT
Start: 2020-04-29

## 2021-03-08 ENCOUNTER — HOSPITAL ENCOUNTER (OUTPATIENT)
Dept: MAMMOGRAPHY | Facility: IMAGING CENTER | Age: 41
Discharge: HOME | End: 2021-03-08
Payer: COMMERCIAL

## 2021-03-08 VITALS — HEIGHT: 70 IN | BODY MASS INDEX: 24.05 KG/M2 | WEIGHT: 168 LBS

## 2021-03-08 DIAGNOSIS — Z12.31 VISIT FOR SCREENING MAMMOGRAM: ICD-10-CM

## 2021-03-08 PROCEDURE — 77063 BREAST TOMOSYNTHESIS BI: CPT

## 2021-03-08 PROCEDURE — 77067 SCR MAMMO BI INCL CAD: CPT

## 2021-03-09 ENCOUNTER — TELEPHONE (OUTPATIENT)
Dept: FAMILY MEDICINE CLINIC | Facility: CLINIC | Age: 41
End: 2021-03-09

## 2021-03-09 NOTE — TELEPHONE ENCOUNTER
----- Message from Miroslava Flores DO sent at 3/9/2021  7:55 AM EST -----  Please let the pt known that her mammogram was normal   Repeat in 1 year

## 2021-05-21 ENCOUNTER — VBI (OUTPATIENT)
Dept: ADMINISTRATIVE | Facility: OTHER | Age: 41
End: 2021-05-21

## 2021-11-17 ENCOUNTER — OFFICE VISIT (OUTPATIENT)
Dept: FAMILY MEDICINE CLINIC | Facility: HOSPITAL | Age: 41
End: 2021-11-17
Payer: COMMERCIAL

## 2021-11-17 VITALS
BODY MASS INDEX: 24.34 KG/M2 | WEIGHT: 170 LBS | TEMPERATURE: 98.4 F | SYSTOLIC BLOOD PRESSURE: 126 MMHG | HEIGHT: 70 IN | DIASTOLIC BLOOD PRESSURE: 70 MMHG | HEART RATE: 70 BPM | OXYGEN SATURATION: 99 %

## 2021-11-17 DIAGNOSIS — G43.109 MIGRAINE WITH AURA AND WITHOUT STATUS MIGRAINOSUS, NOT INTRACTABLE: ICD-10-CM

## 2021-11-17 DIAGNOSIS — E89.0 POSTOPERATIVE HYPOTHYROIDISM: ICD-10-CM

## 2021-11-17 DIAGNOSIS — Z76.89 ENCOUNTER TO ESTABLISH CARE: Primary | ICD-10-CM

## 2021-11-17 DIAGNOSIS — C77.9 METASTATIC PAPILLARY CARCINOMA TO LYMPH NODE (HCC): ICD-10-CM

## 2021-11-17 DIAGNOSIS — D50.0 IRON DEFICIENCY ANEMIA DUE TO CHRONIC BLOOD LOSS: ICD-10-CM

## 2021-11-17 DIAGNOSIS — I73.00 RAYNAUD'S DISEASE WITHOUT GANGRENE: ICD-10-CM

## 2021-11-17 DIAGNOSIS — Z13.220 SCREENING CHOLESTEROL LEVEL: ICD-10-CM

## 2021-11-17 PROBLEM — Z12.39 BREAST CANCER SCREENING: Status: RESOLVED | Noted: 2020-02-05 | Resolved: 2021-11-17

## 2021-11-17 PROBLEM — M72.2 PLANTAR FASCIITIS, LEFT: Status: RESOLVED | Noted: 2017-09-21 | Resolved: 2021-11-17

## 2021-11-17 PROBLEM — Z13.0 SCREENING FOR ENDOCRINE, METABOLIC AND IMMUNITY DISORDER: Status: RESOLVED | Noted: 2018-06-28 | Resolved: 2021-11-17

## 2021-11-17 PROBLEM — R10.9 ABDOMINAL CRAMPING: Status: RESOLVED | Noted: 2020-02-05 | Resolved: 2021-11-17

## 2021-11-17 PROBLEM — Z13.1 SCREENING FOR DIABETES MELLITUS (DM): Status: RESOLVED | Noted: 2018-06-28 | Resolved: 2021-11-17

## 2021-11-17 PROBLEM — Z13.228 SCREENING FOR ENDOCRINE, METABOLIC AND IMMUNITY DISORDER: Status: RESOLVED | Noted: 2018-06-28 | Resolved: 2021-11-17

## 2021-11-17 PROBLEM — Z13.0 SCREENING FOR DEFICIENCY ANEMIA: Status: RESOLVED | Noted: 2020-02-05 | Resolved: 2021-11-17

## 2021-11-17 PROBLEM — G44.209 ACUTE NON INTRACTABLE TENSION-TYPE HEADACHE: Status: RESOLVED | Noted: 2018-06-27 | Resolved: 2021-11-17

## 2021-11-17 PROBLEM — Z13.29 SCREENING FOR ENDOCRINE, METABOLIC AND IMMUNITY DISORDER: Status: RESOLVED | Noted: 2018-06-28 | Resolved: 2021-11-17

## 2021-11-17 PROBLEM — Z00.00 WELL ADULT EXAM: Status: RESOLVED | Noted: 2020-02-05 | Resolved: 2021-11-17

## 2021-11-17 PROCEDURE — 3725F SCREEN DEPRESSION PERFORMED: CPT | Performed by: NURSE PRACTITIONER

## 2021-11-17 PROCEDURE — 1036F TOBACCO NON-USER: CPT | Performed by: NURSE PRACTITIONER

## 2021-11-17 PROCEDURE — 99203 OFFICE O/P NEW LOW 30 MIN: CPT | Performed by: NURSE PRACTITIONER

## 2021-11-17 PROCEDURE — 3008F BODY MASS INDEX DOCD: CPT | Performed by: NURSE PRACTITIONER

## 2021-12-16 LAB
ALBUMIN SERPL-MCNC: 4.1 G/DL (ref 3.8–4.8)
ALBUMIN/GLOB SERPL: 1.5 {RATIO} (ref 1.2–2.2)
ALP SERPL-CCNC: 57 IU/L (ref 44–121)
ALT SERPL-CCNC: 12 IU/L (ref 0–32)
AST SERPL-CCNC: 23 IU/L (ref 0–40)
BASOPHILS # BLD AUTO: 0.1 X10E3/UL (ref 0–0.2)
BASOPHILS NFR BLD AUTO: 1 %
BILIRUB SERPL-MCNC: <0.2 MG/DL (ref 0–1.2)
BUN SERPL-MCNC: 13 MG/DL (ref 6–24)
BUN/CREAT SERPL: 11 (ref 9–23)
CALCIUM SERPL-MCNC: 8.1 MG/DL (ref 8.7–10.2)
CHLORIDE SERPL-SCNC: 102 MMOL/L (ref 96–106)
CHOLEST SERPL-MCNC: 176 MG/DL (ref 100–199)
CHOLEST/HDLC SERPL: 2.6 RATIO (ref 0–4.4)
CO2 SERPL-SCNC: 22 MMOL/L (ref 20–29)
CREAT SERPL-MCNC: 1.15 MG/DL (ref 0.57–1)
EOSINOPHIL # BLD AUTO: 0.1 X10E3/UL (ref 0–0.4)
EOSINOPHIL NFR BLD AUTO: 3 %
ERYTHROCYTE [DISTWIDTH] IN BLOOD BY AUTOMATED COUNT: 17.1 % (ref 11.7–15.4)
FERRITIN SERPL-MCNC: 5 NG/ML (ref 15–150)
GLOBULIN SER-MCNC: 2.7 G/DL (ref 1.5–4.5)
GLUCOSE SERPL-MCNC: 99 MG/DL (ref 65–99)
HCT VFR BLD AUTO: 28.6 % (ref 34–46.6)
HDLC SERPL-MCNC: 69 MG/DL
HGB BLD-MCNC: 8.8 G/DL (ref 11.1–15.9)
IMM GRANULOCYTES # BLD: 0 X10E3/UL (ref 0–0.1)
IMM GRANULOCYTES NFR BLD: 0 %
IRON SERPL-MCNC: 20 UG/DL (ref 27–159)
LDLC SERPL CALC-MCNC: 98 MG/DL (ref 0–99)
LYMPHOCYTES # BLD AUTO: 1.3 X10E3/UL (ref 0.7–3.1)
LYMPHOCYTES NFR BLD AUTO: 30 %
MCH RBC QN AUTO: 22.3 PG (ref 26.6–33)
MCHC RBC AUTO-ENTMCNC: 30.8 G/DL (ref 31.5–35.7)
MCV RBC AUTO: 73 FL (ref 79–97)
MONOCYTES # BLD AUTO: 0.4 X10E3/UL (ref 0.1–0.9)
MONOCYTES NFR BLD AUTO: 8 %
NEUTROPHILS # BLD AUTO: 2.5 X10E3/UL (ref 1.4–7)
NEUTROPHILS NFR BLD AUTO: 58 %
PLATELET # BLD AUTO: 310 X10E3/UL (ref 150–450)
POTASSIUM SERPL-SCNC: 4.3 MMOL/L (ref 3.5–5.2)
PROT SERPL-MCNC: 6.8 G/DL (ref 6–8.5)
RBC # BLD AUTO: 3.94 X10E6/UL (ref 3.77–5.28)
SL AMB EGFR AFRICAN AMERICAN: 68 ML/MIN/1.73
SL AMB EGFR NON AFRICAN AMERICAN: 59 ML/MIN/1.73
SL AMB VLDL CHOLESTEROL CALC: 9 MG/DL (ref 5–40)
SODIUM SERPL-SCNC: 136 MMOL/L (ref 134–144)
TRIGL SERPL-MCNC: 41 MG/DL (ref 0–149)
WBC # BLD AUTO: 4.4 X10E3/UL (ref 3.4–10.8)

## 2021-12-21 ENCOUNTER — TELEPHONE (OUTPATIENT)
Dept: FAMILY MEDICINE CLINIC | Facility: HOSPITAL | Age: 41
End: 2021-12-21

## 2022-01-19 ENCOUNTER — TELEPHONE (OUTPATIENT)
Dept: NEPHROLOGY | Facility: CLINIC | Age: 42
End: 2022-01-19

## 2022-01-19 NOTE — TELEPHONE ENCOUNTER
I left message for patient to call the office if interested in coming in tomorrow 1/20 at 10:30 am to see Dr Silver Sawyer in [facility]

## 2022-02-21 ENCOUNTER — TELEPHONE (OUTPATIENT)
Dept: NEPHROLOGY | Facility: CLINIC | Age: 42
End: 2022-02-21

## 2022-03-01 ENCOUNTER — CONSULT (OUTPATIENT)
Dept: NEPHROLOGY | Facility: HOSPITAL | Age: 42
End: 2022-03-01
Payer: COMMERCIAL

## 2022-03-01 VITALS
WEIGHT: 179 LBS | HEART RATE: 93 BPM | HEIGHT: 70 IN | SYSTOLIC BLOOD PRESSURE: 124 MMHG | BODY MASS INDEX: 25.62 KG/M2 | DIASTOLIC BLOOD PRESSURE: 82 MMHG

## 2022-03-01 DIAGNOSIS — D50.8 IRON DEFICIENCY ANEMIA SECONDARY TO INADEQUATE DIETARY IRON INTAKE: ICD-10-CM

## 2022-03-01 DIAGNOSIS — E83.51 HYPOCALCEMIA: Primary | ICD-10-CM

## 2022-03-01 DIAGNOSIS — N18.2 STAGE 2 CHRONIC KIDNEY DISEASE: ICD-10-CM

## 2022-03-01 DIAGNOSIS — R79.89 ELEVATED SERUM CREATININE: ICD-10-CM

## 2022-03-01 DIAGNOSIS — N18.31 STAGE 3A CHRONIC KIDNEY DISEASE (HCC): ICD-10-CM

## 2022-03-01 PROBLEM — D50.9 IRON DEFICIENCY ANEMIA: Status: ACTIVE | Noted: 2021-11-17

## 2022-03-01 PROCEDURE — 1036F TOBACCO NON-USER: CPT | Performed by: INTERNAL MEDICINE

## 2022-03-01 PROCEDURE — 99214 OFFICE O/P EST MOD 30 MIN: CPT | Performed by: INTERNAL MEDICINE

## 2022-03-01 PROCEDURE — 3008F BODY MASS INDEX DOCD: CPT | Performed by: INTERNAL MEDICINE

## 2022-03-01 RX ORDER — RIBOFLAVIN (VITAMIN B2) 100 MG
100 TABLET ORAL DAILY
COMMUNITY

## 2022-03-01 RX ORDER — IRON POLYSACCHARIDE COMPLEX 150 MG
28 CAPSULE ORAL DAILY
COMMUNITY

## 2022-03-01 NOTE — PATIENT INSTRUCTIONS
Chronic Kidney Disease   WHAT YOU NEED TO KNOW:   Chronic kidney disease (CKD) is the gradual and permanent loss of kidney function  It is also called chronic kidney failure, or chronic renal insufficiency  Normally, the kidneys remove fluid, chemicals, and waste from your blood  These wastes are turned into urine by your kidneys  CKD may worsen over time and lead to kidney failure  Your CKD team will help you and your family plan for your care at home  The team will help you create goals and find ways to meet your goals  Your care plan may change over time as your needs change  DISCHARGE INSTRUCTIONS:   Call your local emergency number (911 in the [address]) if:   · You have a seizure  · You have shortness of breath  Return to the emergency department if:   · You are confused and very drowsy  Call your doctor or nephrologist if:   · You suddenly gain or lose more weight than your healthcare provider has told you is okay  · You have itchy skin or a rash  · You urinate more or less than you normally do  · You have blood in your urine  · You have nausea and are vomiting  · You have fatigue or muscle weakness  · You have hiccups that will not stop  · You have questions or concerns about your condition or care  Medicines:   · Medicines  may be given to decrease blood pressure and get rid of extra fluid  You may also receive medicine to manage health conditions that may occur with CKD, such as anemia, diabetes, and heart disease  · Take your medicine as directed  Contact your healthcare provider if you think your medicine is not helping or if you have side effects  Tell him or her if you are allergic to any medicine  Keep a list of the medicines, vitamins, and herbs you take  Include the amounts, and when and why you take them  Bring the list or the pill bottles to follow-up visits  Carry your medicine list with you in case of an emergency      What you can do to manage CKD: Management may include making some lifestyle changes  Tell your healthcare provider if you have any concerns about being able to make changes  He or she can help you find solutions, including working with specialists  Ask for help creating a plan to break large goals into smaller steps  Your plan may include any of the following:  · Manage other health conditions  Your healthcare provider will work with you to make a care plan that meets your needs  You will be checked regularly for heart disease or other conditions that can make CKD worse, such as diabetes  Your blood pressure will be closely monitored  You will also get a target blood pressure and help making a plan to reach your target  This may include taking your blood pressure at home  · Maintain a healthy weight  Your weight and body mass index (BMI) will be checked regularly  BMI helps find if your weight is healthy for your height  Your healthcare provider will use other tests to check your muscle and protein levels  Extra weight can strain your kidneys  A low weight or low muscle mass can make you feel more tired  You may have trouble doing your daily activities  Ask your provider what a healthy weight is for you  He or she can help you create a plan to lose or gain weight safely, if needed  The plan may include keeping a food diary  This is a list of foods and liquids you have each day  Your provider will use the diary to help you make changes, if needed  Changes are based on your health and any other conditions you have, such as diabetes  · Create an exercise plan  Regular exercise can help you manage CKD, high blood pressure, and diabetes  Exercise also helps control weight  Your provider can help you create exercise goals and a plan to reach those goals  For example, your goal may be to exercise for 30 minutes in a day  Your plan can include breaking exercise into 10 minute sessions, 3 times during the day           · Create a healthy eating plan  Your provider may tell you to eat food low in potassium, phosphorus, or protein  Your provider may also recommend vitamin or mineral supplements  Do not take any supplements without talking to your provider  A dietitian can help you plan meals if needed  Ask how much liquid to drink each day and which liquids are best for you  · Limit sodium (salt) as directed  You may need to limit sodium to less than 2,300 milligrams (mg) each day  Ask your dietitian or healthcare provider how much sodium you can have each day  The amount depends on your stage of kidney disease  Table salt, canned foods, soups, salted snacks, and processed meats, like deli meats and sausage, are high in sodium  Your provider or a dietitian can show you how to read food labels for sodium  · Limit alcohol as directed  Alcohol can cause fluid retention and can affect your kidneys  Ask how much alcohol is safe for you  A drink of alcohol is 12 ounces of beer, 5 ounces of wine, or 1½ ounces of liquor  · Do not smoke  Nicotine and other chemicals in cigarettes and cigars can cause kidney damage  Ask your provider for information if you currently smoke and need help to quit  E-cigarettes or smokeless tobacco still contain nicotine  Talk to your provider before you use these products  · Ask about over-the-counter medicines  Medicines such as NSAIDs and laxatives may harm your kidneys  Some cough and cold medicines can raise your blood pressure  Always ask if a medicine is safe before you take it  · Ask about vaccines you may need  CKD can increase your risk for infections such as pneumonia, influenza, and hepatitis  Vaccines lower your risk for infection  Your healthcare provider will tell you which vaccines you need and when to get them  Follow up with your doctor or nephrologist as directed: You will need to return for tests to monitor your kidney and nerve function, and your parathyroid hormone level   Your medicines may be changed, based on certain test results  Write down your questions so you remember to ask them during your visits  © Copyright [employer] 2022 Information is for End User's use only and may not be sold, redistributed or otherwise used for commercial purposes  All illustrations and images included in CareNotes® are the copyrighted property of [employer]  or [employer]  The above information is an  only  It is not intended as medical advice for individual conditions or treatments  Talk to your doctor, nurse or pharmacist before following any medical regimen to see if it is safe and effective for you

## 2022-03-01 NOTE — PROGRESS NOTES
OFFICE CONSULT - Nephrology   Delmer Fortune 36 y o  female MRN: [id number]    Encounter: [id number]        [address] [place] [address]      1  Elevated creatinine/stage 2 chronic kidney disease  o Her baseline creatinine seems to be around 1 08 and has been stable for several years  o Her urinalysis today is bland  o No hypertension, no type 2 diabetes  o History of renal cancer but no history of any kidney diseases in her family  o Etiology of slightly decreased GFR is unclear but could be medication induced she has been on calcium and vitamin-D supplements for several years after surgery, she is not taking any nephrotoxic agents currently and her renal function has remained relatively stable  o She also may have slightly higher muscle mass which could increase her creatinine an over estimate her GFR  o No renal imaging given her malignancy history will check a renal ultrasound to look at the size, echogenicity of her kidneys and to evaluate for any obstructive process  o Will repeat her blood work to ensure stability of her serum creatinine  o Make further recommendations on going    2  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  She is currently on calcium carbonate vitamin-D 500-50 mg and a calcium carbonate vitamin-D supplement  o Asymptomatic with a calcium that is acceptable will continue to monitor    3    Iron deficiency anemia  o Currently on oral iron  o Ongoing treatment by her primary  o Eval for colonoscopy and other reasons for iron deficiency her primary  o Can continue vitamin-C iron supplementation  o Repeat iron saturation  o Less likely related to decreased GFR    4   Metastatic papillary carcinoma to the lymph nodes  o History of Graves disease as a teenager subsequently had enlarged thyroid gland, biopsy, resection with lymph node resection and parathyroidectomy partial  o Overall stable in following with endocrinology  o No chemotherapy      DISCUSSION/SUMMARY    It was nice meeting Ana Marya today  We discussed her serum creatinine  This is mildly elevated based on lab value  The etiology of this is unclear but could be medication induced  Her urinalysis is bland her creatinine has remained stable for several years she has no hypertension, she has no diabetes  She exercises regularly  She is not taking any nephrotoxic agents  The chances of progression are low    Will plan on checking a renal ultrasound for completeness    If GFR is above 60 on repeat blood work and renal ultrasound is normal, as long as she is getting routine blood work from her primary care physician and endocrinologist we can see her as needed or once a year for a renal check up  She was in agreement with this plan and had no further questions    HPI:  Gamal Juan is a 36 y  o female who was referred by YURIDIA Vasques for evaluation of Evronique Julieth is 36years old, 6th grade  who presents for evaluation of an elevated creatinine  She has a history of papillary thyroid carcinoma status post resection with lymph node dissection, parthyroidectomy who presents for an elevated creatinine    Creatinine has been slightly elevated for several years, no urinary complaints  No family hx of anyone on HD in her family no blood or foam in her urine  Tolerating her medications well without difficulty  No cp, sob, fevers, chills    I personally spent over half of a total 60 minutes face to face with the patient in counseling and discussion and/or coordination of care as described above  ROS:    Review of Systems   Constitutional: Negative  HENT: Negative  Eyes: Negative  Respiratory: Negative  Cardiovascular: Negative  Gastrointestinal: Negative  Endocrine: Negative  Genitourinary: Negative  Musculoskeletal: Negative  Skin: Negative  Allergic/Immunologic: Negative  Neurological: Negative  Hematological: Negative  Psychiatric/Behavioral: Negative  All other systems reviewed and are negative  Allergies: Patient has no known allergies  Medications:   Current Outpatient Medications:     Ascorbic Acid (vitamin C) 100 MG tablet, Take 100 mg by mouth daily, Disp: , Rfl:     calcitriol (ROCALTROL) 0 5 MCG capsule, Take 0 5 mcg by mouth daily, Disp: , Rfl: 5    Calcium Carbonate-Vit D-Min (CALCIUM 1200 PO), Take 11 mg by mouth in the morning, Disp: , Rfl:     Calcium Carbonate-Vitamin D 500-50 MG-UNIT CAPS, Take by mouth, Disp: , Rfl:     iron polysaccharides (FERREX) 150 mg capsule, Take 28 mg by mouth in the morning, Disp: , Rfl:     SYNTHROID 137 MCG tablet, TAKE 1 TABLET MON THRU SAT AND 1/2 TABLET ON SUNDAY, Disp: , Rfl: 5    traZODone (DESYREL) 50 mg tablet, Take 1 tablet (50 mg total) by mouth daily at bedtime (Patient taking differently: Take 50 mg by mouth daily at bedtime  ), Disp: 30 tablet, Rfl: 2    SUMAtriptan (IMITREX) 100 mg tablet, Take by mouth (Patient not taking: Reported on 11/17/2021 ), Disp: , Rfl:     Past Medical History:   Diagnosis Date    Anemia March 2020    Cancer [facility]) Thyroid 2016    Disease of thyroid gland     Grave's disease    Skin rash     Last Assessed [id number]      Thyroid cancer ([facility] [address] ) 2014     Past Surgical History:   Procedure Laterality Date    NECK DISSECTION      NECK SURGERY      Last Assessed [id number]    TOTAL THYROIDECTOMY       Family History   Problem Relation Age of Onset    Thyroid disease Mother         Hyperthyroidism    Scleroderma Mother     Raynaud syndrome Mother     Autoimmune disease Mother     Hyperlipidemia Father     Heart attack Father     Cancer Father         Kidney   Lee Elliott' disease Maternal Grandmother     No Known Problems Daughter     No Known Problems Maternal Grandfather     No Known Problems Paternal Grandmother     No Known Problems Paternal Grandfather     No Known Problems Maternal Aunt     No Known Problems Paternal Aunt     No Known Problems Paternal Aunt       reports that she has quit smoking  She has never used smokeless tobacco  She reports current alcohol use of about 8 0 standard drinks of alcohol per week  She reports that she does not use drugs  OBJECTIVE:    Vitals:    03/01/22 1257   BP: 124/82   BP Location: Left arm   Patient Position: Sitting   Cuff Size: Standard   Pulse: 93   Weight: 81 2 kg (179 lb)   Height: 5' 10" (1 778 m)        Body mass index is 25 68 kg/m²  [unfilled]     Weight (last 2 days)     Date/Time Weight    03/01/22 1257 81 2 (179)           Physical exam:  Physical Exam  Vitals and nursing note reviewed  Constitutional:       General: She is in acute distress  Appearance: Normal appearance  HENT:      Head: Normocephalic and atraumatic  Nose: Nose normal       Mouth/Throat:      Mouth: Mucous membranes are dry  Eyes:      Extraocular Movements: Extraocular movements intact  Conjunctiva/sclera: Conjunctivae normal    Cardiovascular:      Rate and Rhythm: Normal rate  Heart sounds: No friction rub  Pulmonary:      Effort: Pulmonary effort is normal       Breath sounds: No wheezing  Abdominal:      General: Abdomen is flat  There is no distension  Musculoskeletal:         General: Normal range of motion  Cervical back: Normal range of motion  Skin:     General: Skin is warm and dry  Neurological:      General: No focal deficit present  Mental Status: She is alert and oriented to person, place, and time  Mental status is at baseline  Psychiatric:         Mood and Affect: Mood normal          Behavior: Behavior normal          Thought Content:  Thought content normal          Judgment: Judgment normal            Lab Results:     Results for orders placed or performed in visit on 11/17/21   CBC and differential   Result Value Ref Range White Blood Cell Count 4 4 3 4 - 10 8 x10E3/uL    Red Blood Cell Count 3 94 3 77 - 5 28 x10E6/uL    Hemoglobin 8 8 (L) 11 1 - 15 9 g/dL    HCT 28 6 (L) 34 0 - 46 6 %    MCV 73 (L) 79 - 97 fL    MCH 22 3 (L) 26 6 - 33 0 pg    MCHC 30 8 (L) 31 5 - 35 7 g/dL    RDW 17 1 (H) 11 7 - 15 4 %    Platelet Count 146 700 - 450 x10E3/uL    Neutrophils 58 Not Estab  %    Lymphocytes 30 Not Estab  %    Monocytes 8 Not Estab  %    Eosinophils 3 Not Estab  %    Basophils PCT 1 Not Estab  %    Neutrophils (Absolute) 2 5 1 4 - 7 0 x10E3/uL    Lymphocytes (Absolute) 1 3 0 7 - 3 1 x10E3/uL    Monocytes (Absolute) 0 4 0 1 - 0 9 x10E3/uL    Eosinophils (Absolute) 0 1 0 0 - 0 4 x10E3/uL    Basophils ABS 0 1 0 0 - 0 2 x10E3/uL    Immature Granulocytes 0 Not Estab  %    Immature Granulocytes (Absolute) 0 0 0 0 - 0 1 x10E3/uL   Comprehensive metabolic panel   Result Value Ref Range    Glucose, Random 99 65 - 99 mg/dL    BUN 13 6 - 24 mg/dL    Creatinine 1 15 (H) 0 57 - 1 00 mg/dL    eGFR Non African American 59 (L) >59 mL/min/1 73    eGFR  68 >59 mL/min/1 73    SL AMB BUN/CREATININE RATIO 11 9 - 23    Sodium 136 134 - 144 mmol/L    Potassium 4 3 3 5 - 5 2 mmol/L    Chloride 102 96 - 106 mmol/L    CO2 22 20 - 29 mmol/L    CALCIUM 8 1 (L) 8 7 - 10 2 mg/dL    Protein, Total 6 8 6 0 - 8 5 g/dL    Albumin 4 1 3 8 - 4 8 g/dL    Globulin, Total 2 7 1 5 - 4 5 g/dL    Albumin/Globulin Ratio 1 5 1 2 - 2 2    TOTAL BILIRUBIN <0 2 0 0 - 1 2 mg/dL    Alk Phos Isoenzymes 57 44 - 121 IU/L    AST 23 0 - 40 IU/L    ALT 12 0 - 32 IU/L   Lipid panel   Result Value Ref Range    Cholesterol, Total 176 100 - 199 mg/dL    Triglycerides 41 0 - 149 mg/dL    HDL 69 >39 mg/dL    VLDL Cholesterol Calculated 9 5 - 40 mg/dL    LDL Calculated 98 0 - 99 mg/dL    T   Chol/HDL Ratio 2 6 0 0 - 4 4 ratio   Iron   Result Value Ref Range    Iron, Serum 20 (L) 27 - 159 ug/dL   Ferritin   Result Value Ref Range    Ferritin 5 (L) 15 - 150 ng/mL DISCUSSION:      Portions of the record may have been created with voice recognition software  Occasional wrong word or "sound a like" substitutions may have occurred due to the inherent limitations of voice recognition software  Read the chart carefully and recognize, using context, where substitutions have occurred  If you have any questions, please contact the dictating provider

## 2022-03-28 ENCOUNTER — HOSPITAL ENCOUNTER (OUTPATIENT)
Dept: ULTRASOUND IMAGING | Facility: HOSPITAL | Age: 42
Discharge: HOME | End: 2022-03-28
Attending: INTERNAL MEDICINE
Payer: COMMERCIAL

## 2022-03-28 DIAGNOSIS — E83.51 HYPOCALCEMIA: ICD-10-CM

## 2022-03-28 DIAGNOSIS — N18.31 STAGE 3A CHRONIC KIDNEY DISEASE (HCC): ICD-10-CM

## 2022-03-28 DIAGNOSIS — R79.89 ELEVATED SERUM CREATININE: ICD-10-CM

## 2022-03-28 DIAGNOSIS — D50.8 IRON DEFICIENCY ANEMIA SECONDARY TO INADEQUATE DIETARY IRON INTAKE: ICD-10-CM

## 2022-03-28 DIAGNOSIS — N18.2 STAGE 2 CHRONIC KIDNEY DISEASE: ICD-10-CM

## 2022-03-28 PROCEDURE — 76770 US EXAM ABDO BACK WALL COMP: CPT

## 2022-05-18 ENCOUNTER — HOSPITAL ENCOUNTER (OUTPATIENT)
Dept: MAMMOGRAPHY | Facility: IMAGING CENTER | Age: 42
Discharge: HOME | End: 2022-05-18
Payer: COMMERCIAL

## 2022-05-18 VITALS — WEIGHT: 167 LBS | BODY MASS INDEX: 23.91 KG/M2 | HEIGHT: 70 IN

## 2022-05-18 DIAGNOSIS — Z12.31 ENCOUNTER FOR SCREENING MAMMOGRAM FOR MALIGNANT NEOPLASM OF BREAST: ICD-10-CM

## 2022-05-18 PROCEDURE — 77067 SCR MAMMO BI INCL CAD: CPT

## 2022-05-18 PROCEDURE — 77063 BREAST TOMOSYNTHESIS BI: CPT

## 2022-05-19 LAB
ALBUMIN SERPL-MCNC: 4.6 G/DL (ref 3.8–4.8)
ALBUMIN/GLOB SERPL: 1.6 {RATIO} (ref 1.2–2.2)
ALP SERPL-CCNC: 55 IU/L (ref 44–121)
ALT SERPL-CCNC: 11 IU/L (ref 0–32)
AST SERPL-CCNC: 22 IU/L (ref 0–40)
BILIRUB SERPL-MCNC: 0.4 MG/DL (ref 0–1.2)
BUN SERPL-MCNC: 11 MG/DL (ref 6–24)
BUN/CREAT SERPL: 9 (ref 9–23)
CALCIUM SERPL-MCNC: 8.5 MG/DL (ref 8.7–10.2)
CHLORIDE SERPL-SCNC: 104 MMOL/L (ref 96–106)
CO2 SERPL-SCNC: 21 MMOL/L (ref 20–29)
CREAT SERPL-MCNC: 1.21 MG/DL (ref 0.57–1)
EGFR: 57 ML/MIN/1.73
ERYTHROCYTE [DISTWIDTH] IN BLOOD BY AUTOMATED COUNT: 15.1 % (ref 11.7–15.4)
FERRITIN SERPL-MCNC: 18 NG/ML (ref 15–150)
GLOBULIN SER-MCNC: 2.8 G/DL (ref 1.5–4.5)
GLUCOSE SERPL-MCNC: 96 MG/DL (ref 65–99)
HCT VFR BLD AUTO: 39.9 % (ref 34–46.6)
HGB BLD-MCNC: 12.8 G/DL (ref 11.1–15.9)
MAGNESIUM SERPL-MCNC: 1.9 MG/DL (ref 1.6–2.3)
MCH RBC QN AUTO: 27.4 PG (ref 26.6–33)
MCHC RBC AUTO-ENTMCNC: 32.1 G/DL (ref 31.5–35.7)
MCV RBC AUTO: 85 FL (ref 79–97)
PHOSPHATE SERPL-MCNC: 4.2 MG/DL (ref 3–4.3)
PLATELET # BLD AUTO: 269 X10E3/UL (ref 150–450)
POTASSIUM SERPL-SCNC: 4.4 MMOL/L (ref 3.5–5.2)
PROT SERPL-MCNC: 7.4 G/DL (ref 6–8.5)
RBC # BLD AUTO: 4.68 X10E6/UL (ref 3.77–5.28)
SODIUM SERPL-SCNC: 142 MMOL/L (ref 134–144)
URATE SERPL-MCNC: 4.8 MG/DL (ref 2.6–6.2)
WBC # BLD AUTO: 3.9 X10E3/UL (ref 3.4–10.8)

## 2022-05-20 ENCOUNTER — TELEPHONE (OUTPATIENT)
Dept: NEPHROLOGY | Facility: CLINIC | Age: 42
End: 2022-05-20

## 2022-05-20 NOTE — TELEPHONE ENCOUNTER
Called patient and left a voicemail stating the following:    Labs were reviewed creatinine was 1 21 stable from previous  No changes to medications  We can see her on a yearly bases at this point given her relative stability  I will call again on Monday to ensure the patient received this message

## 2022-05-20 NOTE — TELEPHONE ENCOUNTER
----- Message from Shruti Acosta DO sent at 5/19/2022  2:46 PM EDT -----  Her labs were reviewed creatinine was 1 21 stable from previous  No changes to medications  We can see her on a yearly bases at this point given her relative stability thanks

## 2022-05-23 NOTE — TELEPHONE ENCOUNTER
Called patient and left a voicemail stating the following:     Labs were reviewed creatinine was 1 21 stable from previous  No changes to medications  We can see her on a yearly bases at this point given her relative stability      I will call again to ensure the patient received this message  This is the second attempt

## 2022-05-24 NOTE — TELEPHONE ENCOUNTER
Called patient and left a voicemail stating the following:     Labs were reviewed creatinine was 1 21 stable from previous  No changes to medications  We can see her on a yearly bases at this point given her relative stability      I will call again to ensure the patient received this message  This is the third attempt, a letter will be sent out

## 2022-09-02 ENCOUNTER — TELEPHONE (OUTPATIENT)
Dept: NEPHROLOGY | Facility: CLINIC | Age: 42
End: 2022-09-02

## 2022-10-24 ENCOUNTER — TELEPHONE (OUTPATIENT)
Dept: FAMILY MEDICINE CLINIC | Facility: HOSPITAL | Age: 42
End: 2022-10-24

## 2022-10-24 DIAGNOSIS — R79.89 ELEVATED SERUM CREATININE: Primary | ICD-10-CM

## 2022-10-24 NOTE — TELEPHONE ENCOUNTER
I placed orders to recheck her kidney function  This also includes a urine specimen to check for protein

## 2022-10-24 NOTE — TELEPHONE ENCOUNTER
----- Message from Divina Pineda sent at 10/24/2022  1:06 PM EDT -----  Regarding: Bloodwork Prior to Appt? Should I get blood work to monitor my calcium, thyroid, and kidney function prior to my appointment on Nov 14th?

## 2022-11-09 LAB
BUN SERPL-MCNC: 8 MG/DL (ref 6–24)
BUN/CREAT SERPL: 7 (ref 9–23)
CALCIUM SERPL-MCNC: 8.5 MG/DL (ref 8.7–10.2)
CHLORIDE SERPL-SCNC: 102 MMOL/L (ref 96–106)
CO2 SERPL-SCNC: 24 MMOL/L (ref 20–29)
CREAT SERPL-MCNC: 1.1 MG/DL (ref 0.57–1)
EGFR: 64 ML/MIN/1.73
GLUCOSE SERPL-MCNC: 101 MG/DL (ref 70–99)
POTASSIUM SERPL-SCNC: 4.3 MMOL/L (ref 3.5–5.2)
SODIUM SERPL-SCNC: 138 MMOL/L (ref 134–144)

## 2022-11-14 ENCOUNTER — OFFICE VISIT (OUTPATIENT)
Dept: FAMILY MEDICINE CLINIC | Facility: HOSPITAL | Age: 42
End: 2022-11-14

## 2022-11-14 VITALS
SYSTOLIC BLOOD PRESSURE: 122 MMHG | TEMPERATURE: 97 F | DIASTOLIC BLOOD PRESSURE: 70 MMHG | HEART RATE: 74 BPM | OXYGEN SATURATION: 98 % | BODY MASS INDEX: 24.85 KG/M2 | WEIGHT: 173.6 LBS | HEIGHT: 70 IN

## 2022-11-14 DIAGNOSIS — Z13.220 SCREENING CHOLESTEROL LEVEL: ICD-10-CM

## 2022-11-14 DIAGNOSIS — R73.09 ABNORMAL BLOOD SUGAR: ICD-10-CM

## 2022-11-14 DIAGNOSIS — G43.109 MIGRAINE WITH AURA AND WITHOUT STATUS MIGRAINOSUS, NOT INTRACTABLE: ICD-10-CM

## 2022-11-14 DIAGNOSIS — E20.9 HYPOPARATHYROIDISM, UNSPECIFIED HYPOPARATHYROIDISM TYPE (HCC): ICD-10-CM

## 2022-11-14 DIAGNOSIS — Z00.00 ANNUAL PHYSICAL EXAM: ICD-10-CM

## 2022-11-14 DIAGNOSIS — E83.51 HYPOCALCEMIA: ICD-10-CM

## 2022-11-14 DIAGNOSIS — D50.8 IRON DEFICIENCY ANEMIA SECONDARY TO INADEQUATE DIETARY IRON INTAKE: ICD-10-CM

## 2022-11-14 DIAGNOSIS — E89.0 POSTOPERATIVE HYPOTHYROIDISM: ICD-10-CM

## 2022-11-14 DIAGNOSIS — N18.2 STAGE 2 CHRONIC KIDNEY DISEASE: Primary | ICD-10-CM

## 2022-11-14 DIAGNOSIS — I73.00 RAYNAUD'S DISEASE WITHOUT GANGRENE: ICD-10-CM

## 2022-11-14 DIAGNOSIS — C77.9 METASTATIC PAPILLARY CARCINOMA TO LYMPH NODE (HCC): ICD-10-CM

## 2022-11-14 NOTE — ASSESSMENT & PLAN NOTE
Lab Results   Component Value Date    EGFR 64 11/08/2022    EGFR 57 (L) 05/18/2022    CREATININE 1 10 (H) 11/08/2022    CREATININE 1 21 (H) 05/18/2022    CREATININE 1 15 (H) 12/16/2021   EGFR is above 60  Plan to recheck in 6 months along with urine for protein  Nephrology if kidney function worsens

## 2022-11-14 NOTE — PROGRESS NOTES
[facility] [address]     NAME: Mary Smith  AGE: 36 y o  SEX: female  : 1980     DATE: 2022     Assessment and Plan:     Problem List Items Addressed This Visit        Endocrine    Hypothyroidism     Managed by endo         Hypoparathyroidism, unspecified hypoparathyroidism type ([address] )     Managed by endo  Appears she is seen every 2 years for this  Cardiovascular and Mediastinum    Migraine headache     Well controlled  No Imitrex use  Raynaud's disease without gangrene     Stable with lifestyle changes            Immune and Lymphatic    RESOLVED: Metastatic papillary carcinoma to lymph node [facility])       Genitourinary    Stage 2 chronic kidney disease - Primary     Lab Results   Component Value Date    EGFR 64 2022    EGFR 57 (L) 2022    CREATININE 1 10 (H) 2022    CREATININE 1 21 (H) 2022    CREATININE 1 15 (H) 2021   EGFR is above 60  Plan to recheck in 6 months along with urine for protein  Nephrology if kidney function worsens  Relevant Orders    CBC and differential    Comprehensive metabolic panel    Protein, urine, random       Other    Abnormal blood sugar     Check A1C in 6 months  Relevant Orders    Comprehensive metabolic panel    Hemoglobin A1C    Hypocalcemia    Relevant Orders    Comprehensive metabolic panel    Iron deficiency anemia     Periods have improved  Continues on iron supplement  Recheck CBC with ferritin in 6 months  Stop iron 2 weeks prior  Relevant Orders    CBC and differential    Comprehensive metabolic panel    Ferritin      Other Visit Diagnoses     Annual physical exam        Screening cholesterol level        Relevant Orders    Lipid panel          Immunizations and preventive care screenings were discussed with patient today   Appropriate education was printed on patient's after visit summary  Counseling:  Alcohol/drug use: discussed moderation in alcohol intake, the recommendations for healthy alcohol use, and avoidance of illicit drug use  Dental Health: discussed importance of regular tooth brushing, flossing, and dental visits  Injury prevention: discussed safety/seat belts, safety helmets, smoke detectors, carbon dioxide detectors, and smoking near bedding or upholstery  Sexual health: discussed sexually transmitted diseases, partner selection, use of condoms, avoidance of unintended pregnancy, and contraceptive alternatives  · Exercise: the importance of regular exercise/physical activity was discussed  Recommend exercise 3-5 times per week for at least 30 minutes  Depression Screening and Follow-up Plan: Patient was screened for depression during today's encounter  They screened negative with a PHQ-2 score of 0  Return in 1 year (on 11/14/2023)  Chief Complaint:     Chief Complaint   Patient presents with   • Annual Exam      History of Present Illness:     Adult Annual Physical   Patient here for a comprehensive physical exam  The patient reports no problems  Diet and Physical Activity  · Diet/Nutrition: well balanced diet  · Exercise: 5-7 times a week on average  Depression Screening  PHQ-2/9 Depression Screening    Little interest or pleasure in doing things: 0 - not at all  Feeling down, depressed, or hopeless: 0 - not at all  PHQ-2 Score: 0  PHQ-2 Interpretation: Negative depression screen       General Health  · Sleep: sleeps well  · Hearing: normal - bilateral   · Vision: previous LASIK surgery  · Dental: regular dental visits  /GYN Health  · Patient is: premenopausal  · Last menstrual period: 10/26/22  · Contraceptive method: vasectomy  Review of Systems:     Review of Systems   Constitutional: Negative  HENT: Negative    Negative for congestion, dental problem, ear pain, hearing loss, postnasal drip, rhinorrhea, sinus pressure, sinus pain, sore throat, tinnitus and trouble swallowing  Eyes: Negative  Respiratory: Negative  Cardiovascular: Negative  Gastrointestinal: Negative  Endocrine: Negative  Genitourinary: Negative  Musculoskeletal: Negative  Skin: Positive for color change  Negative for pallor, rash and wound  Neurological: Negative  Hematological: Negative  Psychiatric/Behavioral: Negative  Past Medical History:     Past Medical History:   Diagnosis Date   • Anemia March 2020   • Cancer [facility]) Thyroid 2016   • Disease of thyroid gland     Grave's disease   • Metastatic papillary carcinoma to lymph node ([facility] ) 10/13/2014   • Skin rash     Last Assessed [id number]  • Thyroid cancer ([facility] ) 2014      Past Surgical History:     Past Surgical History:   Procedure Laterality Date   • NECK DISSECTION     • NECK SURGERY      Last Assessed [id number]   • TOTAL THYROIDECTOMY        Social History:     Social History     Socioeconomic History   • Marital status: /Civil Union     Spouse name: None   • Number of children: None   • Years of education: None   • Highest education level: None   Occupational History   • None   Tobacco Use   • Smoking status: Former Smoker   • Smokeless tobacco: Never Used   Vaping Use   • Vaping Use: Never used   Substance and Sexual Activity   • Alcohol use:  Yes     Alcohol/week: 8 0 standard drinks     Types: 4 Glasses of wine, 4 Shots of liquor per week     Comment: Occasional social use   • Drug use: Never   • Sexual activity: Yes     Partners: Male     Birth control/protection: Male Sterilization   Other Topics Concern   • None   Social History Narrative   • None     Social Determinants of Health     Financial Resource Strain: Not on file   Food Insecurity: Not on file   Transportation Needs: Not on file   Physical Activity: Not on file   Stress: Not on file   Social Connections: Not on file   Intimate Partner Violence: Not on file   Housing Stability: Not on file Family History:     Family History   Problem Relation Age of Onset   • Thyroid disease Mother         Hyperthyroidism   • Scleroderma Mother    • Raynaud syndrome Mother    • Autoimmune disease Mother    • Hyperlipidemia Father    • Heart attack Father    • Cancer Father         Kidney   • Graves' disease Maternal Grandmother    • No Known Problems Daughter    • No Known Problems Maternal Grandfather    • No Known Problems Paternal Grandmother    • No Known Problems Paternal Grandfather    • No Known Problems Maternal Aunt    • No Known Problems Paternal Aunt    • No Known Problems Paternal Aunt       Current Medications:     Current Outpatient Medications   Medication Sig Dispense Refill   • calcitriol (ROCALTROL) 0 5 MCG capsule Take 0 5 mcg by mouth daily  5   • Calcium Carbonate-Vit D-Min (CALCIUM 1200 PO) Take 11 mg by mouth in the morning     • Calcium Carbonate-Vitamin D 500-50 MG-UNIT CAPS Take by mouth     • iron polysaccharides (FERREX) 150 mg capsule Take 28 mg by mouth in the morning     • SYNTHROID 137 MCG tablet TAKE 1 TABLET MON THRU SAT AND 1/2 TABLET ON SUNDAY  5   • Ascorbic Acid (vitamin C) 100 MG tablet Take 100 mg by mouth daily (Patient not taking: Reported on 11/14/2022)     • SUMAtriptan (IMITREX) 100 mg tablet Take by mouth (Patient not taking: No sig reported)       No current facility-administered medications for this visit  Allergies:     No Known Allergies   Physical Exam:     /70 (BP Location: Left arm, Patient Position: Sitting, Cuff Size: Standard)   Pulse 74   Temp (!) 97 °F (36 1 °C) (Tympanic)   Ht 5' 10" (1 778 m)   Wt 78 7 kg (173 lb 9 6 oz)   SpO2 98%   BMI 24 91 kg/m²     Physical Exam  Vitals reviewed  Constitutional:       Appearance: Normal appearance  She is normal weight  HENT:      Head: Normocephalic and atraumatic        Right Ear: Tympanic membrane, ear canal and external ear normal       Left Ear: Tympanic membrane, ear canal and external ear normal       Nose: Nose normal       Mouth/Throat:      Mouth: Mucous membranes are moist       Pharynx: Oropharynx is clear  Eyes:      Conjunctiva/sclera: Conjunctivae normal       Pupils: Pupils are equal, round, and reactive to light  Cardiovascular:      Rate and Rhythm: Normal rate and regular rhythm  Heart sounds: Normal heart sounds  No murmur heard  Pulmonary:      Effort: Pulmonary effort is normal       Breath sounds: Normal breath sounds  Abdominal:      General: Abdomen is flat  Bowel sounds are normal       Palpations: Abdomen is soft  There is no hepatomegaly or splenomegaly  Tenderness: There is no abdominal tenderness  Musculoskeletal:         General: Normal range of motion  Cervical back: Normal range of motion and neck supple  Skin:     General: Skin is warm and dry  Capillary Refill: Capillary refill takes less than 2 seconds  Neurological:      General: No focal deficit present  Mental Status: She is alert and oriented to person, place, and time  Psychiatric:         Mood and Affect: Mood normal          Behavior: Behavior normal          Thought Content:  Thought content normal          Judgment: Judgment normal           Matthew Le, [address] [id number]

## 2022-11-14 NOTE — ASSESSMENT & PLAN NOTE
Periods have improved  Continues on iron supplement  Recheck CBC with ferritin in 6 months  Stop iron 2 weeks prior

## 2022-11-14 NOTE — PATIENT INSTRUCTIONS

## 2023-06-02 ENCOUNTER — HOSPITAL ENCOUNTER (OUTPATIENT)
Dept: MAMMOGRAPHY | Facility: CLINIC | Age: 43
Discharge: HOME | End: 2023-06-02

## 2023-06-02 VITALS — WEIGHT: 171.96 LBS | BODY MASS INDEX: 24.62 KG/M2 | HEIGHT: 70 IN

## 2023-06-02 DIAGNOSIS — Z12.31 VISIT FOR SCREENING MAMMOGRAM: ICD-10-CM

## 2023-06-02 DIAGNOSIS — Z12.31 ENCOUNTER FOR SCREENING MAMMOGRAM FOR MALIGNANT NEOPLASM OF BREAST: ICD-10-CM

## 2024-03-04 ENCOUNTER — TELEPHONE (OUTPATIENT)
Dept: FAMILY MEDICINE CLINIC | Facility: HOSPITAL | Age: 44
End: 2024-03-04

## 2024-03-04 NOTE — TELEPHONE ENCOUNTER
----- Message from YURIDIA Grijalva sent at 3/4/2024  9:17 AM EST -----  Please call to schedule PE. Not seen since 11/14/22

## 2024-09-20 ENCOUNTER — HOSPITAL ENCOUNTER (OUTPATIENT)
Dept: MAMMOGRAPHY | Facility: CLINIC | Age: 44
Discharge: HOME | End: 2024-09-20
Payer: COMMERCIAL

## 2024-09-20 VITALS — BODY MASS INDEX: 24.48 KG/M2 | HEIGHT: 70 IN | WEIGHT: 171 LBS

## 2024-09-20 DIAGNOSIS — Z12.31 ENCOUNTER FOR SCREENING MAMMOGRAM FOR MALIGNANT NEOPLASM OF BREAST: ICD-10-CM

## 2024-09-20 PROCEDURE — 77063 BREAST TOMOSYNTHESIS BI: CPT

## 2024-09-20 PROCEDURE — 77067 SCR MAMMO BI INCL CAD: CPT

## 2024-10-22 ENCOUNTER — TELEPHONE (OUTPATIENT)
Dept: ADMINISTRATIVE | Facility: OTHER | Age: 44
End: 2024-10-22

## 2024-10-22 NOTE — TELEPHONE ENCOUNTER
----- Message from Deepthi WINCHESTER sent at 10/22/2024  9:54 AM EDT -----  10/22/24 9:54 AM    Hello, our patient Anay Pineda has had Pap Smear (HPV) aka Cervical Cancer Screening completed/performed. Please assist in updating the patient chart by pulling a previous Electronic Medical Record (EMR) document. The previous EMR is in labs under [facility] . The date of service is 5/15/24.    Thank you,  Deepthi George MA  [facility]

## 2024-10-25 NOTE — TELEPHONE ENCOUNTER
Upon review of the In Basket request we were able to locate, review, and update the patient chart as requested for Pap Smear (HPV) aka Cervical Cancer Screening.    Any additional questions or concerns should be emailed to the Practice Liaisons via the appropriate education email address, please do not reply via In Basket.    Thank you  Brian Augustin MA   [facility]

## 2024-12-03 ENCOUNTER — OFFICE VISIT (OUTPATIENT)
Dept: FAMILY MEDICINE CLINIC | Facility: HOSPITAL | Age: 44
End: 2024-12-03
Payer: COMMERCIAL

## 2024-12-03 VITALS
HEIGHT: 69 IN | DIASTOLIC BLOOD PRESSURE: 60 MMHG | TEMPERATURE: 97.2 F | WEIGHT: 167 LBS | OXYGEN SATURATION: 97 % | SYSTOLIC BLOOD PRESSURE: 118 MMHG | HEART RATE: 66 BPM | BODY MASS INDEX: 24.73 KG/M2

## 2024-12-03 DIAGNOSIS — E20.9 HYPOPARATHYROIDISM, UNSPECIFIED HYPOPARATHYROIDISM TYPE (HCC): ICD-10-CM

## 2024-12-03 DIAGNOSIS — N18.2 STAGE 2 CHRONIC KIDNEY DISEASE: ICD-10-CM

## 2024-12-03 DIAGNOSIS — D50.8 IRON DEFICIENCY ANEMIA SECONDARY TO INADEQUATE DIETARY IRON INTAKE: ICD-10-CM

## 2024-12-03 DIAGNOSIS — Z12.11 SCREEN FOR COLON CANCER: ICD-10-CM

## 2024-12-03 DIAGNOSIS — E89.0 POSTOPERATIVE HYPOTHYROIDISM: ICD-10-CM

## 2024-12-03 DIAGNOSIS — Z00.00 ANNUAL PHYSICAL EXAM: Primary | ICD-10-CM

## 2024-12-03 DIAGNOSIS — Z13.220 SCREENING CHOLESTEROL LEVEL: ICD-10-CM

## 2024-12-03 PROCEDURE — 99396 PREV VISIT EST AGE 40-64: CPT | Performed by: NURSE PRACTITIONER

## 2024-12-03 NOTE — PATIENT INSTRUCTIONS
"Patient Education     Routine physical for adults   The Basics   Written by the doctors and editors at [facility]   What is a physical? -- A physical is a routine visit, or \"check-up,\" with your doctor. You might also hear it called a \"wellness visit\" or \"preventive visit.\"  During each visit, the doctor will:   Ask about your physical and mental health   Ask about your habits, behaviors, and lifestyle   Do an exam   Give you vaccines if needed   Talk to you about any medicines you take   Give advice about your health   Answer your questions  Getting regular check-ups is an important part of taking care of your health. It can help your doctor find and treat any problems you have. But it's also important for preventing health problems.  A routine physical is different from a \"sick visit.\" A sick visit is when you see a doctor because of a health concern or problem. Since physicals are scheduled ahead of time, you can think about what you want to ask the doctor.  How often should I get a physical? -- It depends on your age and health. In general, for people age 21 years and older:   If you are younger than 50 years, you might be able to get a physical every 3 years.   If you are 50 years or older, your doctor might recommend a physical every year.  If you have an ongoing health condition, like diabetes or high blood pressure, your doctor will probably want to see you more often.  What happens during a physical? -- In general, each visit will include:   Physical exam - The doctor or nurse will check your height, weight, heart rate, and blood pressure. They will also look at your eyes and ears. They will ask about how you are feeling and whether you have any symptoms that bother you.   Medicines - It's a good idea to bring a list of all the medicines you take to each doctor visit. Your doctor will talk to you about your medicines and answer any questions. Tell them if you are having any side effects that bother you. You " "should also tell them if you are having trouble paying for any of your medicines.   Habits and behaviors - This includes:   Your diet   Your exercise habits   Whether you smoke, drink alcohol, or use drugs   Whether you are sexually active   Whether you feel safe at home  Your doctor will talk to you about things you can do to improve your health and lower your risk of health problems. They will also offer help and support. For example, if you want to quit smoking, they can give you advice and might prescribe medicines. If you want to improve your diet or get more physical activity, they can help you with this, too.   Lab tests, if needed - The tests you get will depend on your age and situation. For example, your doctor might want to check your:   Cholesterol   Blood sugar   Iron level   Vaccines - The recommended vaccines will depend on your age, health, and what vaccines you already had. Vaccines are very important because they can prevent certain serious or deadly infections.   Discussion of screening - \"Screening\" means checking for diseases or other health problems before they cause symptoms. Your doctor can recommend screening based on your age, risk, and preferences. This might include tests to check for:   Cancer, such as breast, prostate, cervical, ovarian, colorectal, prostate, lung, or skin cancer   Sexually transmitted infections, such as chlamydia and gonorrhea   Mental health conditions like depression and anxiety  Your doctor will talk to you about the different types of screening tests. They can help you decide which screenings to have. They can also explain what the results might mean.   Answering questions - The physical is a good time to ask the doctor or nurse questions about your health. If needed, they can refer you to other doctors or specialists, too.  Adults older than 65 years often need other care, too. As you get older, your doctor will talk to you about:   How to prevent falling at " home   Hearing or vision tests   Memory testing   How to take your medicines safely   Making sure that you have the help and support you need at home  All topics are updated as new evidence becomes available and our peer review process is complete.  This topic retrieved from [employer] on: May 02, 2024.  Topic 000270 Version 1.0  Release: 32.4.3 - C32.122  © 2024 UpToDate, Inc. and/or its affiliates. All rights reserved.  Consumer Information Use and Disclaimer   Disclaimer: This generalized information is a limited summary of diagnosis, treatment, and/or medication information. It is not meant to be comprehensive and should be used as a tool to help the user understand and/or assess potential diagnostic and treatment options. It does NOT include all information about conditions, treatments, medications, side effects, or risks that may apply to a specific patient. It is not intended to be medical advice or a substitute for the medical advice, diagnosis, or treatment of a health care provider based on the health care provider's examination and assessment of a patient's specific and unique circumstances. Patients must speak with a health care provider for complete information about their health, medical questions, and treatment options, including any risks or benefits regarding use of medications. This information does not endorse any treatments or medications as safe, effective, or approved for treating a specific patient. UpToDate, Inc. and its affiliates disclaim any warranty or liability relating to this information or the use thereof.The use of this information is governed by the Terms of Use, available at https://www.woltersFleetMaticsuwer.com/en/know/clinical-effectiveness-terms. 2024© UpToDate, Inc. and its affiliates and/or licensors. All rights reserved.  Copyright   © 2024 UpToDate, Inc. and/or its affiliates. All rights reserved.

## 2024-12-03 NOTE — ASSESSMENT & PLAN NOTE
With heavy menses. On iron supplement,   Check labs.   Orders:    CBC and differential; Future    Comprehensive metabolic panel; Future    Iron; Future    Ferritin; Future

## 2024-12-03 NOTE — PROGRESS NOTES
Adult Annual Physical  Name: Anay Pineda      : 1980      MRN: [id number]  Encounter Provider: YURIDIA Grijalva  Encounter Date: 12/3/2024   Encounter department: [facility] [address]     Assessment & Plan  Annual physical exam         Postoperative hypothyroidism  Managed by endo.   On Synthroid.   F/U due in .        Hypoparathyroidism, unspecified hypoparathyroidism type (HCC)  On calcium.   Managed by endo   F/U in   Orders:    Comprehensive metabolic panel; Future    Stage 2 chronic kidney disease  Lab Results   Component Value Date    EGFR 70 10/06/2023    EGFR 64 2022    EGFR 57 (L) 2022    CREATININE 1.02 10/06/2023    CREATININE 1.10 (H) 2022    CREATININE 1.21 (H) 2022   Recent BMP showed normal kidney function.   Recheck    Orders:    Comprehensive metabolic panel; Future    Iron deficiency anemia secondary to inadequate dietary iron intake  With heavy menses. On iron supplement,   Check labs.   Orders:    CBC and differential; Future    Comprehensive metabolic panel; Future    Iron; Future    Ferritin; Future    Screen for colon cancer    Orders:    Ambulatory Referral to Gastroenterology; Future    Screening cholesterol level    Orders:    Lipid panel; Future      Immunizations and preventive care screenings were discussed with patient today. Appropriate education was printed on patient's after visit summary.    Counseling:  Alcohol/drug use: discussed moderation in alcohol intake, the recommendations for healthy alcohol use, and avoidance of illicit drug use.  Dental Health: discussed importance of regular tooth brushing, flossing, and dental visits.  Injury prevention: discussed safety/seat belts, safety helmets, smoke detectors, carbon monoxide detectors, and smoking near bedding or upholstery.  Sexual health: discussed sexually transmitted diseases, partner selection, use of condoms, avoidance of unintended pregnancy, and  "contraceptive alternatives.  Exercise: the importance of regular exercise/physical activity was discussed. Recommend exercise 3-5 times per week for at least 30 minutes.          History of Present Illness     Adult Annual Physical:  Patient presents for annual physical. H/o thyroid cancer. Sees endo every other year. Seen last 2023. Gets US and labs every other year also.   Takes iron. Was told iron is low. Heavy menses. Feels really bad during period. .     Diet and Physical Activity:  - Diet/Nutrition: well balanced diet.  - Exercise: 5-7 times a week on average, walking and strength training exercises.    Depression Screening:  - PHQ-2 Score: 0    General Health:  - Sleep: sleeps poorly.  - Vision: previous LASIK surgery.  - Dental: regular dental visits.    /GYN Health:  - Follows with GYN: yes.   - Menopause: perimenopausal.   - Contraception: male partner had vasectomy.      Advanced Care Planning:  - Has an advanced directive?: no    - Has a durable medical POA?: no    - ACP document given to patient?: yes      Review of Systems   Constitutional:  Positive for fatigue. Negative for activity change, appetite change, chills, diaphoresis, fever and unexpected weight change.   HENT: Negative.     Eyes: Negative.    Respiratory: Negative.     Cardiovascular: Negative.    Gastrointestinal: Negative.    Endocrine: Negative.    Genitourinary:  Positive for menstrual problem. Negative for decreased urine volume, difficulty urinating, dyspareunia, dysuria, enuresis, flank pain, frequency, genital sores, hematuria, pelvic pain, urgency, vaginal bleeding, vaginal discharge and vaginal pain.   Musculoskeletal: Negative.    Skin: Negative.    Neurological: Negative.    Hematological: Negative.    Psychiatric/Behavioral: Negative.           Objective   /60 (Patient Position: Sitting, Cuff Size: Standard)   Pulse 66   Temp (!) 97.2 °F (36.2 °C) (Tympanic)   Ht 5' 9\" (1.753 m)   Wt 75.8 kg (167 lb)   SpO2 97%   " BMI 24.66 kg/m²     Physical Exam  Vitals reviewed.   Constitutional:       Appearance: Normal appearance. She is normal weight.   HENT:      Head: Normocephalic and atraumatic.      Right Ear: Tympanic membrane, ear canal and external ear normal.      Left Ear: Tympanic membrane, ear canal and external ear normal.      Nose: Nose normal.      Mouth/Throat:      Mouth: Mucous membranes are moist.      Pharynx: Oropharynx is clear.   Eyes:      Conjunctiva/sclera: Conjunctivae normal.      Pupils: Pupils are equal, round, and reactive to light.   Cardiovascular:      Rate and Rhythm: Normal rate and regular rhythm.      Heart sounds: Normal heart sounds. No murmur heard.  Pulmonary:      Effort: Pulmonary effort is normal.      Breath sounds: Normal breath sounds.   Abdominal:      General: Abdomen is flat. Bowel sounds are normal.      Palpations: Abdomen is soft. There is no hepatomegaly or splenomegaly.      Tenderness: There is no abdominal tenderness.   Musculoskeletal:         General: Normal range of motion.      Cervical back: Normal range of motion and neck supple.   Lymphadenopathy:      Cervical: No cervical adenopathy.   Skin:     General: Skin is warm and dry.      Capillary Refill: Capillary refill takes less than 2 seconds.   Neurological:      General: No focal deficit present.      Mental Status: She is alert and oriented to person, place, and time.   Psychiatric:         Mood and Affect: Mood normal.         Behavior: Behavior normal.         Thought Content: Thought content normal.         Judgment: Judgment normal.

## 2024-12-03 NOTE — ASSESSMENT & PLAN NOTE
Lab Results   Component Value Date    EGFR 70 10/06/2023    EGFR 64 11/08/2022    EGFR 57 (L) 05/18/2022    CREATININE 1.02 10/06/2023    CREATININE 1.10 (H) 11/08/2022    CREATININE 1.21 (H) 05/18/2022   Recent BMP showed normal kidney function.   Recheck    Orders:    Comprehensive metabolic panel; Future     pulse oximetry

## 2024-12-09 ENCOUNTER — PREP FOR PROCEDURE (OUTPATIENT)
Age: 44
End: 2024-12-09

## 2024-12-09 ENCOUNTER — TELEPHONE (OUTPATIENT)
Age: 44
End: 2024-12-09

## 2024-12-09 DIAGNOSIS — Z12.11 SCREENING FOR COLON CANCER: Primary | ICD-10-CM

## 2024-12-09 NOTE — TELEPHONE ENCOUNTER
Scheduled date of colonoscopy (as of today): 1/31/25  Physician performing colonoscopy:   Location of colonoscopy: [place]  Bowel prep reviewed with patient: Miralax Dulcolax prep instructions reviewed and sent via [employer]  Instructions reviewed with patient by: md  Clearances:

## 2024-12-09 NOTE — TELEPHONE ENCOUNTER
12/09/24  Screened by: Blessing James MA    Referring Provider Elvie Flaherty    Pre- Screening:     BMI  24.66  Has patient been referred for a routine screening Colonoscopy? yes  Is the patient between 45-75 years old? yes      Previous Colonoscopy no   If yes:    Date:     Facility:     Reason:       Does the patient want to see a Gastroenterologist prior to their procedure OR are they having any GI symptoms? no    Has the patient been hospitalized or had abdominal surgery in the past 6 months? no    Does the patient use supplemental oxygen? no    Does the patient take Coumadin, Lovenox, Plavix, Elliquis, Xarelto, or other blood thinning medication? no    Has the patient had a stroke, cardiac event, or stent placed in the past year? no      If patient is between 45yrs - 49yrs, please advise patient that we will have to confirm benefits & coverage with their insurance company for a routine screening colonoscopy.

## 2024-12-09 NOTE — LETTER
December 9, 2024    Anay Pineda  [address]  [place] [address]      Dear MsAylin Pineda:    Below are your prep instructions for your upcoming procedure on 1/31/25. If you have any questions or concerns please contact us at [phone].    Thank you,     [facility]                Medicine Instructions for Adults with Diabetes who Need a Bowel Prep       Follow these instructions when a BOWEL PREP is required for your procedure or surgery!    NOTE:   GLP-1 Agonists taken weekly: do not take in the 7 days before your procedure   SGLT-2 Inhibitors: do not take in the 4 days before your procedure     On the Day Before Surgery/Procedure  If you are having a procedure (e.g. Colonoscopy) or surgery that requires a bowel prep and you may have at least a clear liquid diet, follow the directions below based on the type of medicine you take for your diabetes.     Type of Medicine You Take Examples What to do   Pre-Mixed Insulin - Intermediate Acting Humalog® 75/25, Humulin® 70/30, Novolog® 70/30, Regular Insulin Take ½ your regular dose the evening before your procedure   Rapid/Fast Acting Insulin Humalog® U200, NovoLog®, Apidra®, Fiasp® Take ½ your regular dose the evening before your procedure.   Long-Acting Insulin Lantus®, Levemir®, Tresiba®, Toujeo®, Basaglar® Take your FULL regular dose the day before procedure   Oral Sulfonylurea Glipizide/Glimepiride/Glucotrol® Take ½ your regular dose the evening before your procedure   Other Oral Diabetes Medicines Metformin®, Glucophage®, Glucophage XR®, Riomet®, Glumetza®), Actose®, Avandia®, Glyset®, Prandin® Take your regular dose with dinner in the evening before your procedure   GLP-1 Agonists AdlyxinÒ, ByettaÒ, BydureonÒ, OzempicÒ, SoliquaÒ, TanzeumÒ, TrulicityÒ, VictozaÒ, Saxenda®, Rybelsus® If taken daily, take as normal    If taken weekly, do not take this medicine for 7 days before your procedure  including the day of the procedure (resume taking after the procedure)   SGLT-2 Inhibitors Jardiance®, Invokana®, Farxiga®,   Steglatro®, Brenzavvy®, Qtern®, Segluromet®, Glyxambi®, Synjardy®, Synjardy XR®, Invokamet®, Invokamet XR®, Trijary XR®, Xigduo XR®, Steglujan® Do not take for 4 days before your procedure including the day of the procedure (resume taking after the procedure)                More information continued on back                    Medicine Instructions for Adults with Diabetes who Need a Bowel Prep  Page 2      On the Day of Surgery/Procedure  Follow the directions below based on the type of medicine you take for your diabetes.     Type of Medicine You Take Examples What to do   Long-Acting Insulin Lantus®, Levemir®, Tresiba®, Toujeo®, Basaglar®, Semglee®   If you usually take your Long-Acting Insulin in the morning, take the full dose as scheduled.   GLP-1 Agonists AdlyxinÒ, ByettaÒ, BydureonÒ, OzempicÒ, SoliquaÒ, TanzeumÒ, TrulicityÒ, VictozaÒ, Saxenda®, Rybelsus® Do NOT take this medicine on the day of your procedure (resume taking after the procedure)       On the Day of Surgery/Procedure (continued)  Except for the morning Long-Acting Insulin, DO NOT take ANY diabetic medicine on the day of your procedure unless you were instructed by the doctor who manages your diabetes medicines.    Continue to check your blood sugars.  If you have an insulin pump, ask your endocrinologist for instructions at least 3 days before your procedure. NOTE: If you are not able to ask your endocrinologist in advance, on the day of the procedure set your insulin pump to your basal rate only. Bring your insulin pump supplies to the hospital.     If you have any questions about taking your diabetes medicines prior to your procedure, please contact the doctor who manages your diabetes medicines.

## 2024-12-20 LAB
ALBUMIN SERPL-MCNC: 4 G/DL (ref 3.9–4.9)
ALP SERPL-CCNC: 56 IU/L (ref 44–121)
ALT SERPL-CCNC: 14 IU/L (ref 0–32)
AST SERPL-CCNC: 21 IU/L (ref 0–40)
BASOPHILS # BLD AUTO: 0.1 X10E3/UL (ref 0–0.2)
BASOPHILS NFR BLD AUTO: 2 %
BILIRUB SERPL-MCNC: 0.4 MG/DL (ref 0–1.2)
BUN SERPL-MCNC: 11 MG/DL (ref 6–24)
BUN/CREAT SERPL: 11 (ref 9–23)
CALCIUM SERPL-MCNC: 7.9 MG/DL (ref 8.7–10.2)
CHLORIDE SERPL-SCNC: 105 MMOL/L (ref 96–106)
CHOLEST SERPL-MCNC: 172 MG/DL (ref 100–199)
CHOLEST/HDLC SERPL: 2.8 RATIO (ref 0–4.4)
CO2 SERPL-SCNC: 17 MMOL/L (ref 20–29)
CREAT SERPL-MCNC: 0.97 MG/DL (ref 0.57–1)
EGFR: 73 ML/MIN/1.73
EOSINOPHIL # BLD AUTO: 0.1 X10E3/UL (ref 0–0.4)
EOSINOPHIL NFR BLD AUTO: 3 %
ERYTHROCYTE [DISTWIDTH] IN BLOOD BY AUTOMATED COUNT: 16.9 % (ref 11.7–15.4)
FERRITIN SERPL-MCNC: 10 NG/ML (ref 15–150)
GLOBULIN SER-MCNC: 2.6 G/DL (ref 1.5–4.5)
GLUCOSE SERPL-MCNC: 86 MG/DL (ref 70–99)
HCT VFR BLD AUTO: 29.6 % (ref 34–46.6)
HDLC SERPL-MCNC: 62 MG/DL
HGB BLD-MCNC: 9.2 G/DL (ref 11.1–15.9)
IMM GRANULOCYTES # BLD: 0 X10E3/UL (ref 0–0.1)
IMM GRANULOCYTES NFR BLD: 1 %
IRON SERPL-MCNC: 34 UG/DL (ref 27–159)
LDLC SERPL CALC-MCNC: 100 MG/DL (ref 0–99)
LYMPHOCYTES # BLD AUTO: 1.2 X10E3/UL (ref 0.7–3.1)
LYMPHOCYTES NFR BLD AUTO: 29 %
MCH RBC QN AUTO: 25 PG (ref 26.6–33)
MCHC RBC AUTO-ENTMCNC: 31.1 G/DL (ref 31.5–35.7)
MCV RBC AUTO: 80 FL (ref 79–97)
MONOCYTES # BLD AUTO: 0.3 X10E3/UL (ref 0.1–0.9)
MONOCYTES NFR BLD AUTO: 7 %
NEUTROPHILS # BLD AUTO: 2.4 X10E3/UL (ref 1.4–7)
NEUTROPHILS NFR BLD AUTO: 58 %
PLATELET # BLD AUTO: 300 X10E3/UL (ref 150–450)
POTASSIUM SERPL-SCNC: 4.2 MMOL/L (ref 3.5–5.2)
PROT SERPL-MCNC: 6.6 G/DL (ref 6–8.5)
RBC # BLD AUTO: 3.68 X10E6/UL (ref 3.77–5.28)
SL AMB VLDL CHOLESTEROL CALC: 10 MG/DL (ref 5–40)
SODIUM SERPL-SCNC: 139 MMOL/L (ref 134–144)
TRIGL SERPL-MCNC: 49 MG/DL (ref 0–149)
WBC # BLD AUTO: 4.1 X10E3/UL (ref 3.4–10.8)

## 2024-12-24 ENCOUNTER — RESULTS FOLLOW-UP (OUTPATIENT)
Dept: FAMILY MEDICINE CLINIC | Facility: HOSPITAL | Age: 44
End: 2024-12-24

## 2025-01-31 ENCOUNTER — ANESTHESIA EVENT (OUTPATIENT)
Dept: GASTROENTEROLOGY | Facility: HOSPITAL | Age: 45
End: 2025-01-31
Payer: COMMERCIAL

## 2025-01-31 ENCOUNTER — ANESTHESIA (OUTPATIENT)
Dept: GASTROENTEROLOGY | Facility: HOSPITAL | Age: 45
End: 2025-01-31
Payer: COMMERCIAL

## 2025-01-31 ENCOUNTER — HOSPITAL ENCOUNTER (OUTPATIENT)
Dept: GASTROENTEROLOGY | Facility: HOSPITAL | Age: 45
Setting detail: OUTPATIENT SURGERY
End: 2025-01-31
Attending: STUDENT IN AN ORGANIZED HEALTH CARE EDUCATION/TRAINING PROGRAM
Payer: COMMERCIAL

## 2025-01-31 VITALS
RESPIRATION RATE: 12 BRPM | DIASTOLIC BLOOD PRESSURE: 55 MMHG | SYSTOLIC BLOOD PRESSURE: 107 MMHG | HEIGHT: 69 IN | HEART RATE: 67 BPM | BODY MASS INDEX: 24.44 KG/M2 | WEIGHT: 165 LBS | OXYGEN SATURATION: 99 % | TEMPERATURE: 97.3 F

## 2025-01-31 DIAGNOSIS — Z12.11 SCREENING FOR COLON CANCER: ICD-10-CM

## 2025-01-31 PROCEDURE — G0121 COLON CA SCRN NOT HI RSK IND: HCPCS | Performed by: STUDENT IN AN ORGANIZED HEALTH CARE EDUCATION/TRAINING PROGRAM

## 2025-01-31 RX ORDER — SODIUM CHLORIDE 9 MG/ML
INJECTION, SOLUTION INTRAVENOUS CONTINUOUS PRN
Status: DISCONTINUED | OUTPATIENT
Start: 2025-01-31 | End: 2025-01-31

## 2025-01-31 RX ORDER — LIDOCAINE HYDROCHLORIDE 10 MG/ML
INJECTION, SOLUTION EPIDURAL; INFILTRATION; INTRACAUDAL; PERINEURAL AS NEEDED
Status: DISCONTINUED | OUTPATIENT
Start: 2025-01-31 | End: 2025-01-31

## 2025-01-31 RX ORDER — PROPOFOL 10 MG/ML
INJECTION, EMULSION INTRAVENOUS AS NEEDED
Status: DISCONTINUED | OUTPATIENT
Start: 2025-01-31 | End: 2025-01-31

## 2025-01-31 RX ADMIN — PROPOFOL 30 MG: 10 INJECTION, EMULSION INTRAVENOUS at 08:43

## 2025-01-31 RX ADMIN — SODIUM CHLORIDE: 0.9 INJECTION, SOLUTION INTRAVENOUS at 08:38

## 2025-01-31 RX ADMIN — PROPOFOL 50 MG: 10 INJECTION, EMULSION INTRAVENOUS at 08:53

## 2025-01-31 RX ADMIN — PROPOFOL 100 MG: 10 INJECTION, EMULSION INTRAVENOUS at 08:41

## 2025-01-31 RX ADMIN — PROPOFOL 20 MG: 10 INJECTION, EMULSION INTRAVENOUS at 08:42

## 2025-01-31 RX ADMIN — PROPOFOL 50 MG: 10 INJECTION, EMULSION INTRAVENOUS at 08:45

## 2025-01-31 RX ADMIN — PROPOFOL 50 MG: 10 INJECTION, EMULSION INTRAVENOUS at 08:49

## 2025-01-31 RX ADMIN — LIDOCAINE HYDROCHLORIDE 50 MG: 10 INJECTION, SOLUTION EPIDURAL; INFILTRATION; INTRACAUDAL; PERINEURAL at 08:41

## 2025-01-31 RX ADMIN — PROPOFOL 30 MG: 10 INJECTION, EMULSION INTRAVENOUS at 08:55

## 2025-01-31 NOTE — H&P
"History and Physical -  [facility]  Anay Pineda 44 y.o. female MRN: [id number]    HPI: Anay Pineda is a 44 y.o. female who presents for colonoscopy.    REVIEW OF SYSTEMS: Per the HPI, and otherwise unremarkable.    Historical Information   Past Medical History:   Diagnosis Date    Anemia March 2020    Cancer (HCC) Thyroid 2016    Disease of thyroid gland     Grave's disease    Metastatic papillary carcinoma to lymph node (HCC) 10/13/2014    Skin rash     Last Assessed [id number].    Thyroid cancer (HCC) 2014     Past Surgical History:   Procedure Laterality Date    NECK DISSECTION      NECK SURGERY      Last Assessed 15Oct2014    TOTAL THYROIDECTOMY       Social History   Social History     Substance and Sexual Activity   Alcohol Use Yes    Alcohol/week: 8.0 standard drinks of alcohol    Types: 4 Glasses of wine, 4 Shots of liquor per week    Comment: Occasional social use     Social History     Substance and Sexual Activity   Drug Use Never     Social History     Tobacco Use   Smoking Status Former   Smokeless Tobacco Never     Family History   Problem Relation Age of Onset    Thyroid disease Mother         Hyperthyroidism    Scleroderma Mother     Raynaud syndrome Mother     Autoimmune disease Mother     Hyperlipidemia Father     Heart attack Father     Cancer Father         Kidney    Graves' disease Maternal Grandmother     No Known Problems Daughter     No Known Problems Maternal Grandfather     No Known Problems Paternal Grandmother     No Known Problems Paternal Grandfather     No Known Problems Maternal Aunt     No Known Problems Paternal Aunt     No Known Problems Paternal Aunt        Meds/Allergies       Current Outpatient Medications:     calcitriol (ROCALTROL) 0.5 MCG capsule    iron polysaccharides (FERREX) 150 mg capsule    SYNTHROID 137 MCG tablet    No Known Allergies    Objective     /56   Pulse 71   Temp (!) 97.3 °F (36.3 °C) (Temporal)   Resp 18   Ht 5' 9\" " (1.753 m)   Wt 74.8 kg (165 lb)   LMP 01/15/2025 (Approximate)   SpO2 100%   BMI 24.37 kg/m²     PHYSICAL EXAM    General Appearance: NAD, cooperative, alert  Eyes: Anicteric  GI:  Soft, non-tender, non-distended; normal bowel sounds; no masses, no organomegaly   Rectal: Deferred until procedure  Musculoskeletal: No edema.  Skin:  No jaundice    ASSESSMENT/PLAN:  This is a 44 y.o. female here for colonoscopy, and she is stable and optimized for her procedure.

## 2025-02-21 ENCOUNTER — OFFICE VISIT (OUTPATIENT)
Dept: FAMILY MEDICINE CLINIC | Facility: HOSPITAL | Age: 45
End: 2025-02-21
Payer: COMMERCIAL

## 2025-02-21 VITALS
SYSTOLIC BLOOD PRESSURE: 110 MMHG | DIASTOLIC BLOOD PRESSURE: 78 MMHG | TEMPERATURE: 97 F | HEART RATE: 87 BPM | WEIGHT: 172.8 LBS | BODY MASS INDEX: 25.59 KG/M2 | HEIGHT: 69 IN | OXYGEN SATURATION: 99 %

## 2025-02-21 DIAGNOSIS — E20.9 HYPOPARATHYROIDISM, UNSPECIFIED HYPOPARATHYROIDISM TYPE (HCC): ICD-10-CM

## 2025-02-21 DIAGNOSIS — R05.1 ACUTE COUGH: Primary | ICD-10-CM

## 2025-02-21 PROCEDURE — 99213 OFFICE O/P EST LOW 20 MIN: CPT | Performed by: NURSE PRACTITIONER

## 2025-02-21 RX ORDER — BENZONATATE 200 MG/1
200 CAPSULE ORAL 3 TIMES DAILY PRN
Qty: 20 CAPSULE | Refills: 0 | Status: SHIPPED | OUTPATIENT
Start: 2025-02-21

## 2025-02-21 NOTE — PROGRESS NOTES
"Name: Anay Pineda      : 1980      MRN: [id number]  Encounter Provider: YURIDIA Melo  Encounter Date: 2025   Encounter department: [facility] [address]   :  Assessment & Plan  Acute cough  Post viral w/o acute infectious sx's currently  Advise she use tessalon prn as needed, mucinex in addition, adequate rest and fluids  Recommend she call with worse sx's or if cough persists >1 month    Orders:    benzonatate (TESSALON) 200 MG capsule; Take 1 capsule (200 mg total) by mouth 3 (three) times a day as needed for cough    Hypoparathyroidism, unspecified hypoparathyroidism type (HCC)  Established with and managed by endocrine              History of Present Illness       Has been fighting a cold for past couple of weeks and still has lingering cough. All other symptoms have improved. No fever or chills since symptoms initially started.  Taking nyquil as needed w/some relief. 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 Cough improved today not talking as much.   Denies covid vaccine recently but did have flu shot this season.   Denies known lung disease. Is a non smoker.         Review of Systems   Constitutional: Negative.    HENT:  Positive for congestion. Negative for ear pain and sore throat.    Respiratory:  Positive for cough and shortness of breath (PHAM she attributes to congestion). Negative for wheezing.          Objective   /78 (BP Location: Left arm, Patient Position: Sitting)   Pulse 87   Temp (!) 97 °F (36.1 °C)   Ht 5' 9\" (1.753 m)   Wt 78.4 kg (172 lb 12.8 oz)   LMP 01/15/2025 (Approximate)   SpO2 99%   BMI 25.52 kg/m²          Physical Exam  Vitals reviewed.   Constitutional:       General: She is not in acute distress.     Appearance: Normal appearance.   HENT:      Head: Normocephalic.      Right Ear: Tympanic membrane normal.      Left Ear: Tympanic membrane normal.      Nose: Nose normal.      Mouth/Throat: "      Mouth: Mucous membranes are moist.      Pharynx: Oropharynx is clear.   Eyes:      General:         Right eye: No discharge.         Left eye: No discharge.   Cardiovascular:      Rate and Rhythm: Normal rate and regular rhythm.   Pulmonary:      Effort: Pulmonary effort is normal. No respiratory distress.      Breath sounds: Normal breath sounds.      Comments: No cough  Musculoskeletal:      Cervical back: Normal range of motion.   Lymphadenopathy:      Cervical: No cervical adenopathy.   Skin:     General: Skin is warm and dry.   Neurological:      General: No focal deficit present.      Mental Status: She is alert and oriented to person, place, and time.   Psychiatric:         Mood and Affect: Mood normal.         Behavior: Behavior normal.         Administrative Statements   I have spent a total time of 15 minutes in caring for this patient on the day of the visit/encounter including Instructions for management, Patient and family education, Impressions, Counseling / Coordination of care, Documenting in the medical record, Reviewing/placing orders in the medical record (including tests, medications, and/or procedures), and Obtaining or reviewing history